# Patient Record
Sex: FEMALE | Race: OTHER | Employment: OTHER | URBAN - METROPOLITAN AREA
[De-identification: names, ages, dates, MRNs, and addresses within clinical notes are randomized per-mention and may not be internally consistent; named-entity substitution may affect disease eponyms.]

---

## 2017-01-16 ENCOUNTER — TELEPHONE (OUTPATIENT)
Dept: FAMILY MEDICINE CLINIC | Facility: CLINIC | Age: 72
End: 2017-01-16

## 2017-01-19 ENCOUNTER — OFFICE VISIT (OUTPATIENT)
Dept: FAMILY MEDICINE CLINIC | Facility: CLINIC | Age: 72
End: 2017-01-19

## 2017-01-19 VITALS — WEIGHT: 142 LBS | DIASTOLIC BLOOD PRESSURE: 70 MMHG | BODY MASS INDEX: 31 KG/M2 | SYSTOLIC BLOOD PRESSURE: 110 MMHG

## 2017-01-19 DIAGNOSIS — J18.0 BRONCHOPNEUMONIA: Primary | ICD-10-CM

## 2017-01-19 PROCEDURE — 99212 OFFICE O/P EST SF 10 MIN: CPT | Performed by: FAMILY MEDICINE

## 2017-01-19 RX ORDER — AMOXICILLIN AND CLAVULANATE POTASSIUM 875; 125 MG/1; MG/1
1 TABLET, FILM COATED ORAL 2 TIMES DAILY
Qty: 20 TABLET | Refills: 0 | Status: SHIPPED | OUTPATIENT
Start: 2017-01-19 | End: 2017-01-29

## 2017-01-19 NOTE — PROGRESS NOTES
[address] [facility] Office Note  Chief Complaint:   Patient presents with:  Cough: phlem      HPI:   This is a 73year old female coming in for    No results found for this or any previous visit.     No past medical history on fi Clear to auscultation bilterally, no rales/rhonchi/wheezing. CHEST: No tenderness. ABDOMEN:  Soft, nondistended, nontender, bowel sounds normal in all 4 quadrants, no masses, no hepatosplenomegaly. BACK: No tenderness, no spasm, SLR test negative, FROM.

## 2017-02-03 ENCOUNTER — TELEPHONE (OUTPATIENT)
Dept: FAMILY MEDICINE CLINIC | Facility: CLINIC | Age: 72
End: 2017-02-03

## 2017-02-03 DIAGNOSIS — S62.91XA RIGHT HAND FRACTURE, CLOSED, INITIAL ENCOUNTER: Primary | ICD-10-CM

## 2017-02-03 NOTE — TELEPHONE ENCOUNTER
Name and # given to daughter for specialist, if she has any issues call back so we can see what we can do.

## 2017-02-04 RX ORDER — MECLIZINE HYDROCHLORIDE 25 MG/1
25 TABLET ORAL 3 TIMES DAILY PRN
Qty: 15 TABLET | Refills: 0 | Status: SHIPPED | OUTPATIENT
Start: 2017-02-04 | End: 2017-02-10

## 2017-02-04 RX ORDER — ONDANSETRON 4 MG/1
4 TABLET, FILM COATED ORAL EVERY 4 HOURS PRN
Qty: 20 TABLET | Refills: 0 | Status: SHIPPED | OUTPATIENT
Start: 2017-02-04 | End: 2019-01-10

## 2017-02-06 PROBLEM — S52.90XA CLOSED FRACTURE OF RADIUS: Status: ACTIVE | Noted: 2017-02-06

## 2017-02-06 PROBLEM — M25.531 RIGHT WRIST PAIN: Status: ACTIVE | Noted: 2017-02-06

## 2017-02-08 ENCOUNTER — MED REC SCAN ONLY (OUTPATIENT)
Dept: FAMILY MEDICINE CLINIC | Facility: CLINIC | Age: 72
End: 2017-02-08

## 2017-02-09 ENCOUNTER — OFFICE VISIT (OUTPATIENT)
Dept: FAMILY MEDICINE CLINIC | Facility: CLINIC | Age: 72
End: 2017-02-09

## 2017-02-09 VITALS
HEIGHT: 57 IN | SYSTOLIC BLOOD PRESSURE: 110 MMHG | BODY MASS INDEX: 30.42 KG/M2 | WEIGHT: 141 LBS | DIASTOLIC BLOOD PRESSURE: 70 MMHG

## 2017-02-09 DIAGNOSIS — M81.0 OSTEOPOROSIS: ICD-10-CM

## 2017-02-09 DIAGNOSIS — S62.91XA RIGHT HAND FRACTURE, CLOSED, INITIAL ENCOUNTER: Primary | ICD-10-CM

## 2017-02-09 PROCEDURE — 99214 OFFICE O/P EST MOD 30 MIN: CPT | Performed by: FAMILY MEDICINE

## 2017-02-10 RX ORDER — ACETAMINOPHEN 325 MG/1
650 TABLET ORAL EVERY 6 HOURS PRN
Status: ON HOLD | COMMUNITY
End: 2017-02-14

## 2017-02-10 NOTE — PROGRESS NOTES
[address] [place] [address] Family Medicine Office Pre-OP Note    HPI:   Gallo Landers is a 73year old female with a hx of right hand  fracture, who presents for a pre-operative physical exam. Patient is to have ORIF, to by done by Dr. Varsha Christopher at [address] [facility] on February 14, 2017.      Tamera Collins pain, visual loss, blurred vision, double vision or yellow sclerae. Ears, Nose, Throat:  Denies hearing loss, sneezing, congestion, runny nose or sore throat. INTEGUMENTARY:  Denies rashes, itching, skin lesion, or excessive skin dryness.   CARDIOVASCULAR: No rashes, no skin lesion, no bruising, good turgor. HEART:  Regular rate and rhythm, no murmurs, rubs or gallops. LUNGS: Clear to auscultation bilterally, no rales/rhonchi/wheezing. CHEST: No tenderness.   ABDOMEN:  Soft, nondistended, nontender, bowel

## 2017-02-12 PROBLEM — S52.509A RADIUS AND ULNA DISTAL FRACTURE: Status: ACTIVE | Noted: 2017-02-12

## 2017-02-12 PROBLEM — S52.609A RADIUS AND ULNA DISTAL FRACTURE: Status: ACTIVE | Noted: 2017-02-12

## 2017-02-14 ENCOUNTER — APPOINTMENT (OUTPATIENT)
Dept: GENERAL RADIOLOGY | Facility: HOSPITAL | Age: 72
End: 2017-02-14
Attending: ORTHOPAEDIC SURGERY
Payer: MEDICARE

## 2017-02-14 ENCOUNTER — HOSPITAL ENCOUNTER (OUTPATIENT)
Facility: HOSPITAL | Age: 72
Setting detail: HOSPITAL OUTPATIENT SURGERY
Discharge: HOME | End: 2017-02-14
Attending: ORTHOPAEDIC SURGERY | Admitting: ORTHOPAEDIC SURGERY
Payer: MEDICARE

## 2017-02-14 ENCOUNTER — ANESTHESIA (OUTPATIENT)
Dept: SURGERY | Facility: HOSPITAL | Age: 72
End: 2017-02-14
Payer: MEDICARE

## 2017-02-14 ENCOUNTER — ANESTHESIA EVENT (OUTPATIENT)
Dept: SURGERY | Facility: HOSPITAL | Age: 72
End: 2017-02-14
Payer: MEDICARE

## 2017-02-14 ENCOUNTER — SURGERY (OUTPATIENT)
Age: 72
End: 2017-02-14

## 2017-02-14 VITALS
TEMPERATURE: 97 F | SYSTOLIC BLOOD PRESSURE: 153 MMHG | BODY MASS INDEX: 29.18 KG/M2 | HEART RATE: 88 BPM | OXYGEN SATURATION: 99 % | HEIGHT: 58 IN | DIASTOLIC BLOOD PRESSURE: 91 MMHG | WEIGHT: 139 LBS | RESPIRATION RATE: 15 BRPM

## 2017-02-14 DIAGNOSIS — S52.501A RADIUS AND ULNA DISTAL FRACTURE, RIGHT, CLOSED, INITIAL ENCOUNTER: Primary | ICD-10-CM

## 2017-02-14 DIAGNOSIS — S52.601A RADIUS AND ULNA DISTAL FRACTURE, RIGHT, CLOSED, INITIAL ENCOUNTER: Primary | ICD-10-CM

## 2017-02-14 PROCEDURE — 64450 NJX AA&/STRD OTHER PN/BRANCH: CPT | Performed by: ANESTHESIOLOGY

## 2017-02-14 PROCEDURE — 64415 NJX AA&/STRD BRCH PLXS IMG: CPT | Performed by: ORTHOPAEDIC SURGERY

## 2017-02-14 PROCEDURE — 76942 ECHO GUIDE FOR BIOPSY: CPT | Performed by: ORTHOPAEDIC SURGERY

## 2017-02-14 PROCEDURE — 0PSH04Z REPOSITION RIGHT RADIUS WITH INTERNAL FIXATION DEVICE, OPEN APPROACH: ICD-10-PCS | Performed by: ORTHOPAEDIC SURGERY

## 2017-02-14 PROCEDURE — 99152 MOD SED SAME PHYS/QHP 5/>YRS: CPT | Performed by: ORTHOPAEDIC SURGERY

## 2017-02-14 PROCEDURE — 3E0T3BZ INTRODUCTION OF ANESTHETIC AGENT INTO PERIPHERAL NERVES AND PLEXI, PERCUTANEOUS APPROACH: ICD-10-PCS | Performed by: ANESTHESIOLOGY

## 2017-02-14 PROCEDURE — 76000 FLUOROSCOPY <1 HR PHYS/QHP: CPT

## 2017-02-14 DEVICE — SCREW BN 2.4MM 16MM LCP SS: Type: IMPLANTABLE DEVICE | Site: RADIUS | Status: FUNCTIONAL

## 2017-02-14 DEVICE — SCREW BN 2.4MM 20MM LCP SS: Type: IMPLANTABLE DEVICE | Site: RADIUS | Status: FUNCTIONAL

## 2017-02-14 DEVICE — SCREW BN 2.4MM 12MM LCP SS: Type: IMPLANTABLE DEVICE | Site: RADIUS | Status: FUNCTIONAL

## 2017-02-14 DEVICE — IMPLANTABLE DEVICE: Type: IMPLANTABLE DEVICE | Site: RADIUS | Status: FUNCTIONAL

## 2017-02-14 RX ORDER — CEPHALEXIN 500 MG/1
500 CAPSULE ORAL 4 TIMES DAILY
Qty: 12 CAPSULE | Refills: 0 | Status: SHIPPED | OUTPATIENT
Start: 2017-02-14 | End: 2017-02-17

## 2017-02-14 RX ORDER — MORPHINE SULFATE 2 MG/ML
2 INJECTION, SOLUTION INTRAMUSCULAR; INTRAVENOUS EVERY 10 MIN PRN
Status: DISCONTINUED | OUTPATIENT
Start: 2017-02-14 | End: 2017-02-14

## 2017-02-14 RX ORDER — ROPIVACAINE HYDROCHLORIDE 5 MG/ML
INJECTION, SOLUTION EPIDURAL; INFILTRATION; PERINEURAL AS NEEDED
Status: DISCONTINUED | OUTPATIENT
Start: 2017-02-14 | End: 2017-02-14 | Stop reason: SURG

## 2017-02-14 RX ORDER — ONDANSETRON 2 MG/ML
INJECTION INTRAMUSCULAR; INTRAVENOUS AS NEEDED
Status: DISCONTINUED | OUTPATIENT
Start: 2017-02-14 | End: 2017-02-14 | Stop reason: SURG

## 2017-02-14 RX ORDER — FAMOTIDINE 20 MG/1
20 TABLET ORAL ONCE
Status: DISCONTINUED | OUTPATIENT
Start: 2017-02-14 | End: 2017-02-14 | Stop reason: HOSPADM

## 2017-02-14 RX ORDER — ACETAMINOPHEN 325 MG/1
650 TABLET ORAL ONCE
Status: DISCONTINUED | OUTPATIENT
Start: 2017-02-14 | End: 2017-02-14 | Stop reason: HOSPADM

## 2017-02-14 RX ORDER — METOCLOPRAMIDE HYDROCHLORIDE 5 MG/ML
INJECTION INTRAMUSCULAR; INTRAVENOUS AS NEEDED
Status: DISCONTINUED | OUTPATIENT
Start: 2017-02-14 | End: 2017-02-14 | Stop reason: SURG

## 2017-02-14 RX ORDER — HYDROCODONE BITARTRATE AND ACETAMINOPHEN 5; 325 MG/1; MG/1
1 TABLET ORAL AS NEEDED
Status: DISCONTINUED | OUTPATIENT
Start: 2017-02-14 | End: 2017-02-14

## 2017-02-14 RX ORDER — DEXAMETHASONE SODIUM PHOSPHATE 10 MG/ML
INJECTION, SOLUTION INTRAMUSCULAR; INTRAVENOUS AS NEEDED
Status: DISCONTINUED | OUTPATIENT
Start: 2017-02-14 | End: 2017-02-14 | Stop reason: SURG

## 2017-02-14 RX ORDER — MORPHINE SULFATE 4 MG/ML
4 INJECTION, SOLUTION INTRAMUSCULAR; INTRAVENOUS EVERY 10 MIN PRN
Status: DISCONTINUED | OUTPATIENT
Start: 2017-02-14 | End: 2017-02-14

## 2017-02-14 RX ORDER — LIDOCAINE HYDROCHLORIDE 10 MG/ML
INJECTION, SOLUTION EPIDURAL; INFILTRATION; INTRACAUDAL; PERINEURAL AS NEEDED
Status: DISCONTINUED | OUTPATIENT
Start: 2017-02-14 | End: 2017-02-14 | Stop reason: SURG

## 2017-02-14 RX ORDER — NALOXONE HYDROCHLORIDE 0.4 MG/ML
80 INJECTION, SOLUTION INTRAMUSCULAR; INTRAVENOUS; SUBCUTANEOUS AS NEEDED
Status: DISCONTINUED | OUTPATIENT
Start: 2017-02-14 | End: 2017-02-14

## 2017-02-14 RX ORDER — ONDANSETRON 2 MG/ML
4 INJECTION INTRAMUSCULAR; INTRAVENOUS ONCE AS NEEDED
Status: DISCONTINUED | OUTPATIENT
Start: 2017-02-14 | End: 2017-02-14

## 2017-02-14 RX ORDER — HYDROMORPHONE HYDROCHLORIDE 1 MG/ML
0.6 INJECTION, SOLUTION INTRAMUSCULAR; INTRAVENOUS; SUBCUTANEOUS EVERY 5 MIN PRN
Status: DISCONTINUED | OUTPATIENT
Start: 2017-02-14 | End: 2017-02-14

## 2017-02-14 RX ORDER — HYDROCODONE BITARTRATE AND ACETAMINOPHEN 5; 325 MG/1; MG/1
1 TABLET ORAL EVERY 4 HOURS PRN
Qty: 40 TABLET | Refills: 1 | Status: SHIPPED | OUTPATIENT
Start: 2017-02-14 | End: 2017-02-24

## 2017-02-14 RX ORDER — HYDROMORPHONE HYDROCHLORIDE 1 MG/ML
0.4 INJECTION, SOLUTION INTRAMUSCULAR; INTRAVENOUS; SUBCUTANEOUS EVERY 5 MIN PRN
Status: DISCONTINUED | OUTPATIENT
Start: 2017-02-14 | End: 2017-02-14

## 2017-02-14 RX ORDER — SODIUM CHLORIDE, SODIUM LACTATE, POTASSIUM CHLORIDE, CALCIUM CHLORIDE 600; 310; 30; 20 MG/100ML; MG/100ML; MG/100ML; MG/100ML
INJECTION, SOLUTION INTRAVENOUS CONTINUOUS
Status: DISCONTINUED | OUTPATIENT
Start: 2017-02-14 | End: 2017-02-14

## 2017-02-14 RX ORDER — HYDROMORPHONE HYDROCHLORIDE 1 MG/ML
0.2 INJECTION, SOLUTION INTRAMUSCULAR; INTRAVENOUS; SUBCUTANEOUS EVERY 5 MIN PRN
Status: DISCONTINUED | OUTPATIENT
Start: 2017-02-14 | End: 2017-02-14

## 2017-02-14 RX ORDER — HYDROCODONE BITARTRATE AND ACETAMINOPHEN 5; 325 MG/1; MG/1
2 TABLET ORAL AS NEEDED
Status: DISCONTINUED | OUTPATIENT
Start: 2017-02-14 | End: 2017-02-14

## 2017-02-14 RX ORDER — MORPHINE SULFATE 10 MG/ML
6 INJECTION, SOLUTION INTRAMUSCULAR; INTRAVENOUS EVERY 10 MIN PRN
Status: DISCONTINUED | OUTPATIENT
Start: 2017-02-14 | End: 2017-02-14

## 2017-02-14 RX ORDER — MIDAZOLAM HYDROCHLORIDE 1 MG/ML
INJECTION INTRAMUSCULAR; INTRAVENOUS AS NEEDED
Status: DISCONTINUED | OUTPATIENT
Start: 2017-02-14 | End: 2017-02-14 | Stop reason: SURG

## 2017-02-14 RX ORDER — DEXAMETHASONE SODIUM PHOSPHATE 4 MG/ML
VIAL (ML) INJECTION AS NEEDED
Status: DISCONTINUED | OUTPATIENT
Start: 2017-02-14 | End: 2017-02-14 | Stop reason: SURG

## 2017-02-14 RX ORDER — SODIUM CHLORIDE, SODIUM LACTATE, POTASSIUM CHLORIDE, CALCIUM CHLORIDE 600; 310; 30; 20 MG/100ML; MG/100ML; MG/100ML; MG/100ML
INJECTION, SOLUTION INTRAVENOUS CONTINUOUS PRN
Status: DISCONTINUED | OUTPATIENT
Start: 2017-02-14 | End: 2017-02-14 | Stop reason: SURG

## 2017-02-14 RX ORDER — METOCLOPRAMIDE 10 MG/1
10 TABLET ORAL ONCE
Status: DISCONTINUED | OUTPATIENT
Start: 2017-02-14 | End: 2017-02-14 | Stop reason: HOSPADM

## 2017-02-14 RX ADMIN — MIDAZOLAM HYDROCHLORIDE 1 MG: 1 INJECTION INTRAMUSCULAR; INTRAVENOUS at 13:37:00

## 2017-02-14 RX ADMIN — ONDANSETRON 4 MG: 2 INJECTION INTRAMUSCULAR; INTRAVENOUS at 15:11:00

## 2017-02-14 RX ADMIN — SODIUM CHLORIDE, SODIUM LACTATE, POTASSIUM CHLORIDE, CALCIUM CHLORIDE: 600; 310; 30; 20 INJECTION, SOLUTION INTRAVENOUS at 14:13:00

## 2017-02-14 RX ADMIN — ROPIVACAINE HYDROCHLORIDE 30 ML: 5 INJECTION, SOLUTION EPIDURAL; INFILTRATION; PERINEURAL at 13:48:00

## 2017-02-14 RX ADMIN — LIDOCAINE HYDROCHLORIDE 25 MG: 10 INJECTION, SOLUTION EPIDURAL; INFILTRATION; INTRACAUDAL; PERINEURAL at 13:41:00

## 2017-02-14 RX ADMIN — LIDOCAINE HYDROCHLORIDE 50 MG: 10 INJECTION, SOLUTION EPIDURAL; INFILTRATION; INTRACAUDAL; PERINEURAL at 14:17:00

## 2017-02-14 RX ADMIN — DEXAMETHASONE SODIUM PHOSPHATE 8 MG: 4 MG/ML VIAL (ML) INJECTION at 14:20:00

## 2017-02-14 RX ADMIN — SODIUM CHLORIDE, SODIUM LACTATE, POTASSIUM CHLORIDE, CALCIUM CHLORIDE: 600; 310; 30; 20 INJECTION, SOLUTION INTRAVENOUS at 15:27:00

## 2017-02-14 RX ADMIN — DEXAMETHASONE SODIUM PHOSPHATE 10 MG: 10 INJECTION, SOLUTION INTRAMUSCULAR; INTRAVENOUS at 13:48:00

## 2017-02-14 RX ADMIN — ROPIVACAINE HYDROCHLORIDE 10 ML: 5 INJECTION, SOLUTION EPIDURAL; INFILTRATION; PERINEURAL at 13:51:00

## 2017-02-14 RX ADMIN — METOCLOPRAMIDE HYDROCHLORIDE 10 MG: 5 INJECTION INTRAMUSCULAR; INTRAVENOUS at 14:20:00

## 2017-02-14 NOTE — BRIEF OP NOTE
[address]  Brief Op Note      [facility] Location: OR   [facility] [id number] MRN [id number]   Admission Date 2/14/2017 Operation Date 2/14/2017   Attending Physician Zahraa Powers MD Operating Physician James Alanis MD       Pre-Opera

## 2017-02-14 NOTE — INTERVAL H&P NOTE
Pre-op Diagnosis: right distal radius fracture    The above referenced H&P was reviewed by Nico Gonzales MD on 2/14/2017, the patient was examined and no significant changes have occurred in the patient's condition since the H&P was performed.   I discuss

## 2017-02-14 NOTE — ANESTHESIA POSTPROCEDURE EVALUATION
Patient: Daria Favor    Procedure Summary     Date Anesthesia Start Anesthesia Stop Room / Location    02/14/17 1413 1522 [address] [facility] MAIN OR 05 / [facility] MAIN OR       Procedure Diagnosis Surgeon Responsible Provider    HAND OPEN REDUCTION INTERNAL FIXATION (Right )

## 2017-02-14 NOTE — ANESTHESIA PROCEDURE NOTES
Peripheral Block  Performed by: CATHY BARNES  Authorized by: CATHY BARNES    Patient Location:  Pre-op  Start Time:  2/14/2017 1:37 PM  End Time:  2/14/2017 1:52 PM  Site Identification: ultrasound guided, real time ultrasound guided, nerve stimulato

## 2017-02-14 NOTE — ANESTHESIA PREPROCEDURE EVALUATION
Anesthesia PreOp Note    HPI:     Daria Driver is a 73year old female who presents for preoperative consultation requested by:  Nany Durbin MD    Date of Surgery: 2/14/2017    Procedure(s):  HAND OPEN REDUCTION INTERNAL FIXATION  Indication: right dis Rfl: 3 Past Week at Unknown time       Current Facility-Administered Medications Ordered in Epic:  lactated ringers infusion  Intravenous Continuous Rachael Paez MD   acetaminophen (TYLENOL) tab 650 mg 650 mg Oral Once Beatriz Aguirre MD   famoTIDine ROS  Exercise tolerance: good    Rhythm: regular    Neuro/Psych - negative ROS     GI/Hepatic/Renal - negative ROS   (+) GERD well controlled,     Endo/Other - negative ROS   Abdominal         npo p mn  Daughter interprets     Anesthesia Plan:   ASA:  2  P

## 2017-02-14 NOTE — DISCHARGE SUMMARY
Outpatient Surgery Brief Discharge Summary         Patient ID:  Lila Valero  [id number]  73year old  10/10/1945    Discharge Diagnoses: right distal radius fracture    Procedures: [unfilled]    Discharged Condition: stable    Disposition: home    Patient

## 2017-02-16 NOTE — OPERATIVE REPORT
[facility]    PATIENT'S NAME: Dale Indy PHYSICIAN: Justo Rojas MD   OPERATING PHYSICIAN: Justo Rojas MD   PATIENT ACCOUNT#:   [id number]    LOCATION:  [place] [address]  MEDICAL RECORD #:   [id number]       DATE OF BIRTH into the radiocarpal joint and the distal radioulnar joint in multiple areas. The fracture was unstable and displaced.   There was fibrous tissue and some interposed periosteal tissue, as well as some muscle remnants which were interposed at the fracture s hardware is in appropriate position.       Dictated By Felipa Vega MD  d: 02/16/2017 13:06:22  t: 02/16/2017 14:38:34  Job [id number]  XO/

## 2017-04-10 ENCOUNTER — MED REC SCAN ONLY (OUTPATIENT)
Dept: FAMILY MEDICINE CLINIC | Facility: CLINIC | Age: 72
End: 2017-04-10

## 2017-05-10 ENCOUNTER — MED REC SCAN ONLY (OUTPATIENT)
Dept: FAMILY MEDICINE CLINIC | Facility: CLINIC | Age: 72
End: 2017-05-10

## 2017-06-27 ENCOUNTER — NURSE ONLY (OUTPATIENT)
Dept: FAMILY MEDICINE CLINIC | Facility: CLINIC | Age: 72
End: 2017-06-27

## 2017-06-27 DIAGNOSIS — Z79.899 ENCOUNTER FOR LONG-TERM CURRENT USE OF MEDICATION: ICD-10-CM

## 2017-06-27 DIAGNOSIS — Z00.00 LABORATORY EXAMINATION ORDERED AS PART OF A ROUTINE GENERAL MEDICAL EXAMINATION: Primary | ICD-10-CM

## 2017-06-27 DIAGNOSIS — M80.00XD AGE-RELATED OSTEOPOROSIS WITH CURRENT PATHOLOGICAL FRACTURE WITH ROUTINE HEALING, SUBSEQUENT ENCOUNTER: ICD-10-CM

## 2017-06-27 DIAGNOSIS — E78.00 HYPERCHOLESTEREMIA: ICD-10-CM

## 2017-06-27 PROCEDURE — 36415 COLL VENOUS BLD VENIPUNCTURE: CPT | Performed by: FAMILY MEDICINE

## 2017-06-27 PROCEDURE — 80053 COMPREHEN METABOLIC PANEL: CPT | Performed by: FAMILY MEDICINE

## 2017-06-27 PROCEDURE — 85025 COMPLETE CBC W/AUTO DIFF WBC: CPT | Performed by: FAMILY MEDICINE

## 2017-06-27 PROCEDURE — 80061 LIPID PANEL: CPT | Performed by: FAMILY MEDICINE

## 2017-06-27 NOTE — PROGRESS NOTES
Patient presented to clinic for routine blood work. Orders verified in patient chart. Patient due for routines back in May. Name and  verified. Patient is fasting. Blood drawn from the right dorsal hand, tolerated well without complications.

## 2017-07-03 ENCOUNTER — TELEPHONE (OUTPATIENT)
Dept: FAMILY MEDICINE CLINIC | Facility: CLINIC | Age: 72
End: 2017-07-03

## 2017-11-27 ENCOUNTER — OFFICE VISIT (OUTPATIENT)
Dept: FAMILY MEDICINE CLINIC | Facility: CLINIC | Age: 72
End: 2017-11-27

## 2017-11-27 VITALS
DIASTOLIC BLOOD PRESSURE: 80 MMHG | BODY MASS INDEX: 30.85 KG/M2 | SYSTOLIC BLOOD PRESSURE: 118 MMHG | WEIGHT: 143 LBS | OXYGEN SATURATION: 100 % | HEIGHT: 57 IN | HEART RATE: 75 BPM

## 2017-11-27 DIAGNOSIS — Z00.00 ENCOUNTER FOR MEDICARE ANNUAL WELLNESS EXAM: ICD-10-CM

## 2017-11-27 DIAGNOSIS — M81.0 OSTEOPOROSIS WITHOUT CURRENT PATHOLOGICAL FRACTURE, UNSPECIFIED OSTEOPOROSIS TYPE: Primary | ICD-10-CM

## 2017-11-27 DIAGNOSIS — R10.13 DYSPEPSIA: ICD-10-CM

## 2017-11-27 DIAGNOSIS — N39.3 STRESS INCONTINENCE OF URINE: ICD-10-CM

## 2017-11-27 PROCEDURE — G0439 PPPS, SUBSEQ VISIT: HCPCS | Performed by: FAMILY MEDICINE

## 2017-11-27 RX ORDER — ALENDRONATE SODIUM 70 MG/1
70 TABLET ORAL WEEKLY
Qty: 12 TABLET | Refills: 3 | Status: SHIPPED | OUTPATIENT
Start: 2017-11-27 | End: 2018-12-17

## 2017-11-28 ENCOUNTER — TELEPHONE (OUTPATIENT)
Dept: FAMILY MEDICINE CLINIC | Facility: CLINIC | Age: 72
End: 2017-11-28

## 2017-11-28 NOTE — TELEPHONE ENCOUNTER
Pt's daughter called, stated her mother saw Dr. Oliverio Hull yesterday and she mentioned a pain on her shoulder from a possible cyst. The daughter explained that it was noticed about 3 weeks ago, but it is painful and she is worried it might be malignant.  It was

## 2017-11-29 NOTE — PROGRESS NOTES
HPI:   Andrew Oakley is a 81year old female who presents for a Medicare Subsequent Annual Wellness visit (Pt already had Initial Annual Wellness).     Annual Physical due on 11/27/2018        Patient Care Team: Patient Care Team:  Radha Sam MD as PCP - unusual skin lesions  EYES: denies blurred vision or double vision  HEENT: denies nasal congestion, sinus pain or ST  LUNGS: denies shortness of breath with exertion  CARDIOVASCULAR: denies chest pain on exertion  GI: denies abdominal pain, denies heartbur murmur, rub, or gallop   Abdomen:   Soft, non-tender, bowel sounds active all four quadrants,  no masses, no organomegaly   Pelvic: Deferred   Extremities: Extremities normal, atraumatic, no cyanosis or edema   Pulses: 2+ and symmetric   Skin: Skin color, current health state?: Good    How do you maintain positive mental well-being?: Social Interaction; Visiting Friends; Visiting Family    If you are a male age 38-65 or a female age 47-67, do you take aspirin?: Yes    Have you had any immunizations at another Lab or Procedure External Lab or Procedure   Diabetes Screening      HbgA1C   Annually No results found for: A1C No flowsheet data found.     Fasting Blood Sugar (FSB)Annually   Glucose (mg/dL)   Date Value   06/27/2017 85   ----------       Cardiovascular found Medium/high risk factors:   End-stage renal disease   Hemophiliacs who received Factor VIII or IX concentrates   Clients of institutions for the mentally retarded   Persons who live in the same house as a HepB virus carrier   Homosexual men   Illicit

## 2017-11-29 NOTE — PATIENT INSTRUCTIONS
Maribel Blanco's SCREENING SCHEDULE   Tests on this list are recommended by your physician but may not be covered, or covered at this frequency, by your insurer. Please check with your insurance carrier before scheduling to verify coverage.    PREVENTATIVE more often if abnormal Colonoscopy,10 Years due on 08/16/2026 Update Health Maintenance if applicable    Flex Sigmoidoscopy Screen  Covered every 5 years No results found for this or any previous visit. No flowsheet data found.      Fecal Occult Blood   Cov Pneumococcal 23 (Pneumovax)  Covered Once after 65 No orders found for this or any previous visit. Please get once after your 65th birthday    Hepatitis B for Moderate/High Risk       No orders found for this or any previous visit.  Medium/high risk factors

## 2018-12-17 ENCOUNTER — TELEPHONE (OUTPATIENT)
Dept: FAMILY MEDICINE CLINIC | Facility: CLINIC | Age: 73
End: 2018-12-17

## 2018-12-17 RX ORDER — OMEPRAZOLE 20 MG/1
20 CAPSULE, DELAYED RELEASE ORAL
Qty: 30 CAPSULE | Refills: 2 | Status: SHIPPED | OUTPATIENT
Start: 2018-12-17 | End: 2019-01-10

## 2018-12-17 RX ORDER — ALENDRONATE SODIUM 70 MG/1
70 TABLET ORAL WEEKLY
Qty: 12 TABLET | Refills: 3 | OUTPATIENT
Start: 2018-12-17

## 2018-12-17 RX ORDER — ALENDRONATE SODIUM 70 MG/1
70 TABLET ORAL WEEKLY
Qty: 12 TABLET | Refills: 3 | Status: SHIPPED | OUTPATIENT
Start: 2018-12-17 | End: 2019-01-10

## 2020-03-03 RX ORDER — OMEPRAZOLE 20 MG/1
CAPSULE, DELAYED RELEASE ORAL
Qty: 90 CAPSULE | Refills: 3 | OUTPATIENT
Start: 2020-03-03

## 2020-03-03 RX ORDER — ALENDRONATE SODIUM 70 MG/1
70 TABLET ORAL WEEKLY
Qty: 12 TABLET | Refills: 3 | OUTPATIENT
Start: 2020-03-03

## 2020-12-14 ENCOUNTER — TELEPHONE (OUTPATIENT)
Dept: GASTROENTEROLOGY | Facility: CLINIC | Age: 75
End: 2020-12-14

## 2020-12-14 ENCOUNTER — OFFICE VISIT (OUTPATIENT)
Dept: GASTROENTEROLOGY | Facility: CLINIC | Age: 75
End: 2020-12-14
Payer: MEDICARE

## 2020-12-14 VITALS
DIASTOLIC BLOOD PRESSURE: 76 MMHG | BODY MASS INDEX: 30.42 KG/M2 | HEIGHT: 57 IN | HEART RATE: 87 BPM | SYSTOLIC BLOOD PRESSURE: 146 MMHG | WEIGHT: 141 LBS

## 2020-12-14 DIAGNOSIS — R79.89 ELEVATED LFTS: Primary | ICD-10-CM

## 2020-12-14 PROCEDURE — 99204 OFFICE O/P NEW MOD 45 MIN: CPT | Performed by: INTERNAL MEDICINE

## 2020-12-14 PROCEDURE — G0463 HOSPITAL OUTPT CLINIC VISIT: HCPCS | Performed by: INTERNAL MEDICINE

## 2020-12-14 RX ORDER — SODIUM, POTASSIUM,MAG SULFATES 17.5-3.13G
SOLUTION, RECONSTITUTED, ORAL ORAL
Qty: 1 BOTTLE | Refills: 0 | Status: ON HOLD | OUTPATIENT
Start: 2020-12-14 | End: 2021-01-20

## 2020-12-14 NOTE — PATIENT INSTRUCTIONS
# history of polyps  # reflux  # elevated LFTs, acute, infection negative.  LFTs normal except 9/2019 when she was taking tylenol      Recommend:  RUQ US  Repeat labs in 8-12 weeks-- LFTs  -Schedule EGD/colonoscopy w/ MAC sedation for history of polyps  -Pr

## 2020-12-14 NOTE — TELEPHONE ENCOUNTER
Scheduled for:  Colonoscopy 98047 EGD [id number]  Provider Name:  Dr Natali Galaviz  Date:  01/20/2021  Location:  [facility]  Sedation:  MAC  Time:  0800 (pt is aware to arrive at 0700)    Prep:  MAC  Meds/Allergies Reconciled?:  Physician reviewed  Diagnosis with codes:  El

## 2020-12-14 NOTE — H&P
[facility] - Gastroenterology                                                                                                  Clinic History and Physical Alcohol use: No    Drug use: No       Medications (Active prior to today's visit):  Current Outpatient Medications   Medication Sig Dispense Refill   • OMEPRAZOLE 20 MG Oral Capsule Delayed Release TAKE 1 CAPSULE BY MOUTH EVERY DAY 90 capsule 1   • RAMAKRISHNA gross movement of all 4 extremities     Labs/Imaging:     Patient's labs and imaging were reviewed and discussed with patient today. .  ASSESSMENT/PLAN:       # history of polyps  # reflux  # elevated LFTs, acute, infection negative.  LFTs normal except 9

## 2020-12-29 ENCOUNTER — HOSPITAL ENCOUNTER (OUTPATIENT)
Dept: ULTRASOUND IMAGING | Age: 75
Discharge: HOME | End: 2020-12-29
Attending: INTERNAL MEDICINE
Payer: MEDICARE

## 2020-12-29 DIAGNOSIS — R79.89 ELEVATED LFTS: ICD-10-CM

## 2020-12-29 PROCEDURE — 76705 ECHO EXAM OF ABDOMEN: CPT | Performed by: INTERNAL MEDICINE

## 2021-01-07 ENCOUNTER — TELEPHONE (OUTPATIENT)
Dept: GASTROENTEROLOGY | Facility: CLINIC | Age: 76
End: 2021-01-07

## 2021-01-07 NOTE — TELEPHONE ENCOUNTER
Tawana Pelletier MD  P [facility] Clinical Staff             Recall RUQ US in 1 year to follow up gallbladder polyp   Repeat LFTs ordered

## 2021-01-13 NOTE — PAT NURSING NOTE
I reviewed time and location of the procedure at [facility] with the patient. As well was all NE criteria. I also went over prep times and diet prior to procedure.

## 2021-01-14 ENCOUNTER — TELEPHONE (OUTPATIENT)
Dept: GASTROENTEROLOGY | Facility: CLINIC | Age: 76
End: 2021-01-14

## 2021-01-14 NOTE — TELEPHONE ENCOUNTER
Pts daughter Ardyce Acron called with questions about ultrasound results and also upcoming colonoscopy. Please call.

## 2021-01-14 NOTE — TELEPHONE ENCOUNTER
[facility] Verner [facility] [address] on [facility]-    Pt daughter called in regarding pt's most recent US Abdomen Results completed on 12/29/2020.  Please advise on the following results:     FINDINGS:          LIVER:               The liver measures approxima

## 2021-01-14 NOTE — TELEPHONE ENCOUNTER
OFFICE/CLINIC ON-CALL:    Please let patient know that she should keep appt with Dr. Sona Hdez for EGd/CLN. I reviewed the ab US--->nothing urgent, no gallstones. No obvious acute cholecystitis.  She has a small Gb polyp which can be monitored on repeat ab US i

## 2021-01-14 NOTE — TELEPHONE ENCOUNTER
RN spoke to pt to rely results below. Verified . Voiced understanding and recall placed for 1 year US Abdomen Recall. Entered into Epic. Recall US Abdomen in 1 year per Dr. Fernando Breaux. Last US Abdomen done 2020.  Recall entered into Patient Outreach

## 2021-01-17 ENCOUNTER — LAB ENCOUNTER (OUTPATIENT)
Dept: LAB | Facility: HOSPITAL | Age: 76
End: 2021-01-17
Attending: INTERNAL MEDICINE
Payer: MEDICARE

## 2021-01-17 DIAGNOSIS — Z01.818 PREOP TESTING: ICD-10-CM

## 2021-01-17 LAB — SARS-COV-2 RNA RESP QL NAA+PROBE: NOT DETECTED

## 2021-01-20 ENCOUNTER — HOSPITAL ENCOUNTER (OUTPATIENT)
Age: 76
Setting detail: HOSPITAL OUTPATIENT SURGERY
Discharge: HOME | End: 2021-01-20
Attending: INTERNAL MEDICINE | Admitting: INTERNAL MEDICINE
Payer: MEDICARE

## 2021-01-20 ENCOUNTER — ANESTHESIA EVENT (OUTPATIENT)
Dept: ENDOSCOPY | Age: 76
End: 2021-01-20
Payer: MEDICARE

## 2021-01-20 ENCOUNTER — ANESTHESIA (OUTPATIENT)
Dept: ENDOSCOPY | Age: 76
End: 2021-01-20
Payer: MEDICARE

## 2021-01-20 VITALS
RESPIRATION RATE: 19 BRPM | HEART RATE: 60 BPM | HEIGHT: 57 IN | DIASTOLIC BLOOD PRESSURE: 78 MMHG | WEIGHT: 140 LBS | SYSTOLIC BLOOD PRESSURE: 142 MMHG | BODY MASS INDEX: 30.2 KG/M2 | OXYGEN SATURATION: 100 %

## 2021-01-20 DIAGNOSIS — Z01.818 PREOP TESTING: Primary | ICD-10-CM

## 2021-01-20 DIAGNOSIS — R79.89 ELEVATED LFTS: ICD-10-CM

## 2021-01-20 PROCEDURE — 99070 SPECIAL SUPPLIES PHYS/QHP: CPT | Performed by: INTERNAL MEDICINE

## 2021-01-20 PROCEDURE — 45385 COLONOSCOPY W/LESION REMOVAL: CPT | Performed by: INTERNAL MEDICINE

## 2021-01-20 PROCEDURE — 43239 EGD BIOPSY SINGLE/MULTIPLE: CPT | Performed by: INTERNAL MEDICINE

## 2021-01-20 RX ORDER — SODIUM CHLORIDE, SODIUM LACTATE, POTASSIUM CHLORIDE, CALCIUM CHLORIDE 600; 310; 30; 20 MG/100ML; MG/100ML; MG/100ML; MG/100ML
INJECTION, SOLUTION INTRAVENOUS CONTINUOUS
Status: DISCONTINUED | OUTPATIENT
Start: 2021-01-20 | End: 2021-01-20

## 2021-01-20 RX ORDER — SODIUM CHLORIDE, SODIUM LACTATE, POTASSIUM CHLORIDE, CALCIUM CHLORIDE 600; 310; 30; 20 MG/100ML; MG/100ML; MG/100ML; MG/100ML
INJECTION, SOLUTION INTRAVENOUS CONTINUOUS PRN
Status: DISCONTINUED | OUTPATIENT
Start: 2021-01-20 | End: 2021-01-20 | Stop reason: SURG

## 2021-01-20 RX ORDER — NALOXONE HYDROCHLORIDE 0.4 MG/ML
80 INJECTION, SOLUTION INTRAMUSCULAR; INTRAVENOUS; SUBCUTANEOUS AS NEEDED
Status: DISCONTINUED | OUTPATIENT
Start: 2021-01-20 | End: 2021-01-20

## 2021-01-20 RX ORDER — LIDOCAINE HYDROCHLORIDE 10 MG/ML
INJECTION, SOLUTION EPIDURAL; INFILTRATION; INTRACAUDAL; PERINEURAL AS NEEDED
Status: DISCONTINUED | OUTPATIENT
Start: 2021-01-20 | End: 2021-01-20 | Stop reason: SURG

## 2021-01-20 RX ADMIN — SODIUM CHLORIDE, SODIUM LACTATE, POTASSIUM CHLORIDE, CALCIUM CHLORIDE: 600; 310; 30; 20 INJECTION, SOLUTION INTRAVENOUS at 08:48:00

## 2021-01-20 RX ADMIN — SODIUM CHLORIDE, SODIUM LACTATE, POTASSIUM CHLORIDE, CALCIUM CHLORIDE: 600; 310; 30; 20 INJECTION, SOLUTION INTRAVENOUS at 08:09:00

## 2021-01-20 RX ADMIN — LIDOCAINE HYDROCHLORIDE 50 MG: 10 INJECTION, SOLUTION EPIDURAL; INFILTRATION; INTRACAUDAL; PERINEURAL at 08:09:00

## 2021-01-20 NOTE — ANESTHESIA POSTPROCEDURE EVALUATION
Patient: Cheryl Epps    Procedure Summary     Date: 01/20/21 Room / Location: [facility] ENDOSCOPY 01 / [facility] ENDO    Anesthesia Start: 2601 Anesthesia Stop: 0848    Procedures:       COLONOSCOPY (N/A )      ESOPHAGOGASTRODUODENOSCOPY (EGD) (N/A ) Diagnosis:

## 2021-01-20 NOTE — H&P
Pre Procedure History & Physical Examination    Patient Name: Romana Ramires  MRN: [id number]  [facility]: [id number]  YOB: 1945    Diagnosis: reflux and history of polyps      •  OMEPRAZOLE 20 MG Oral Capsule Delayed Release, TAKE 1 CAPSULE BY MOUTH jaundice   ALLERGIC/IMMUNOLOGIC:  negative for hay fever  ENDOCRINE:  negative for cold intolerance and heat intolerance  MUSCULOSKELETAL:  negative for joint effusion/severe erythema  BEHAVIOR/PSYCH:  negative for psychotic behavior      PHYSICAL EXAM:

## 2021-01-20 NOTE — ANESTHESIA PREPROCEDURE EVALUATION
Anesthesia PreOp Note    HPI:     Noe Reese is a 70year old female who presents for preoperative consultation requested by: Ab Dill MD    Date of Surgery: 1/20/2021    Procedure(s):  COLONOSCOPY  ESOPHAGOGASTRODUODENOSCOPY (EGD)  Indication: Elev MG Oral Tab, 1-2 tablets every 8 hrs prn pain (Patient not taking: Reported on 12/14/2020 ), Disp: 30 tablet, Rfl: 1      No current Epic-ordered facility-administered medications on file. No current Epic-ordered outpatient medications on file.       No K Results   Component Value Date    WBC 6.4 11/30/2020    RBC 4.74 11/30/2020    HGB 14.1 11/30/2020    HCT 41.6 11/30/2020    MCV 87.8 11/30/2020    MCH 29.7 11/30/2020    MCHC 33.9 11/30/2020    RDW 14.2 11/30/2020     11/30/2020     Lab Results   C

## 2021-01-20 NOTE — OPERATIVE REPORT
ESOPHAGOGASTRODUODENOSCOPY (EGD) & COLONOSCOPY REPORT    Kirsten Modi     10/10/1945 Age 70year old   PCP Sampson Ferrell MD Endoscopist Mary Pate MD     Date of procedure: 21    Procedure: EGD w/ biopsies & Colonoscopy w/cold snare polypectomy withdrew the instrument from the patient who tolerated the procedure well. Complications: None    EGD findings:      1. Esophagus: The squamocolumnar junction was noted at 30 cm and appeared regular.  The GE junction was noted at 30 cm from the incisors and sigmoid polyp

## 2021-01-26 ENCOUNTER — TELEPHONE (OUTPATIENT)
Dept: GASTROENTEROLOGY | Facility: CLINIC | Age: 76
End: 2021-01-26

## 2021-01-27 ENCOUNTER — TELEPHONE (OUTPATIENT)
Dept: GASTROENTEROLOGY | Facility: CLINIC | Age: 76
End: 2021-01-27

## 2021-01-27 NOTE — TELEPHONE ENCOUNTER
RN reached out to pt's daughter to relay notes below. Pt's [facility] verified pt's . Read Dr. Leonarda Kilgore result note below and advised that next CLN procedure, based off current health care guidelines, will be due 2026.  Daughter voiced unders

## 2021-01-27 NOTE — TELEPHONE ENCOUNTER
Entered into [facility]. Recall CLN in 3 years per Dr. Malcolm Arshad. Last CLN done 01/20/2021. Recall entered into Patient Outreach for 01/20/2024. HM updated.

## 2021-01-27 NOTE — TELEPHONE ENCOUNTER
----- Message from Radha Newell MD sent at 1/27/2021  1:05 PM CST -----  GI staff: please place recall for colonoscopy in 3 years   Replied to telephone encounter for nursing to relay results.

## 2021-01-27 NOTE — TELEPHONE ENCOUNTER
Please notify of below:         Dear Sravani Catalan,    The biopsies from the stomach were negative for H pylori infection. No obvious severe reflux changes. Continue antireflux measures.      I reviewed the pathology report from the polyp(s) completely removed dur

## 2021-03-09 DIAGNOSIS — Z23 NEED FOR VACCINATION: ICD-10-CM

## 2021-04-13 ENCOUNTER — TELEPHONE (OUTPATIENT)
Dept: GASTROENTEROLOGY | Facility: CLINIC | Age: 76
End: 2021-04-13

## 2021-06-21 ENCOUNTER — TELEPHONE (OUTPATIENT)
Dept: GASTROENTEROLOGY | Facility: CLINIC | Age: 76
End: 2021-06-21

## 2021-06-21 NOTE — TELEPHONE ENCOUNTER
Pt's daughter Jamison Newberry states Dr wanted pt to do follow up blood work a couple of months after the colonoscopy  but there are no orders in the system.  Please call Jamison Newberry at [phone]

## 2021-06-21 NOTE — TELEPHONE ENCOUNTER
Per review of chart, [facility] labs active in orders. Placed by Dr. Lamar Newberry 12/14/2020. Returned patients daughter call to inform on orders and notify may complete at any [place] lab location at her earliest convenience.      Patients daughter verbalized under

## 2021-08-30 ENCOUNTER — LAB ENCOUNTER (OUTPATIENT)
Dept: LAB | Age: 76
End: 2021-08-30
Attending: INTERNAL MEDICINE
Payer: MEDICARE

## 2021-08-30 LAB
ALBUMIN SERPL-MCNC: 3.3 G/DL (ref 3.4–5)
ALP LIVER SERPL-CCNC: 72 U/L
ALT SERPL-CCNC: 20 U/L
AST SERPL-CCNC: 25 U/L (ref 15–37)
BILIRUB DIRECT SERPL-MCNC: 0.2 MG/DL (ref 0–0.2)
BILIRUB SERPL-MCNC: 0.6 MG/DL (ref 0.1–2)
M PROTEIN MFR SERPL ELPH: 7.1 G/DL (ref 6.4–8.2)

## 2021-08-30 PROCEDURE — 36415 COLL VENOUS BLD VENIPUNCTURE: CPT | Performed by: INTERNAL MEDICINE

## 2021-08-30 PROCEDURE — 80076 HEPATIC FUNCTION PANEL: CPT | Performed by: INTERNAL MEDICINE

## 2021-09-03 ENCOUNTER — TELEPHONE (OUTPATIENT)
Dept: GASTROENTEROLOGY | Facility: CLINIC | Age: 76
End: 2021-09-03

## 2021-09-03 NOTE — TELEPHONE ENCOUNTER
Liver function tests have become normal  Will check in 6 months to a year  Follow up in clinic in that time frame

## 2021-09-03 NOTE — TELEPHONE ENCOUNTER
Dr. Shima Prater,     Patient completed hfp labs 08/30/2021 and would like to review results and next steps if indicated. Attached below for review. Thank you!      Component      Latest Ref Rng & Units 8/30/2021   AST (SGOT)      15 - 37 U/L 25   ALT (SGP

## 2021-09-03 NOTE — TELEPHONE ENCOUNTER
Reached out to [facility]. Verified . Reviewed lab results and recommendations per MD below. Understands and stated she will call back to schedule follow up in clinic, as recommended (6 month).      Assured our office will notify her when she is due

## 2022-04-15 ENCOUNTER — HOSPITAL ENCOUNTER (EMERGENCY)
Facility: HOSPITAL | Age: 77
Discharge: HOME | End: 2022-04-15
Payer: MEDICARE

## 2022-04-15 ENCOUNTER — APPOINTMENT (OUTPATIENT)
Dept: GENERAL RADIOLOGY | Facility: HOSPITAL | Age: 77
End: 2022-04-15
Payer: MEDICARE

## 2022-04-15 VITALS
HEART RATE: 99 BPM | DIASTOLIC BLOOD PRESSURE: 79 MMHG | WEIGHT: 140 LBS | BODY MASS INDEX: 30 KG/M2 | RESPIRATION RATE: 16 BRPM | SYSTOLIC BLOOD PRESSURE: 161 MMHG | OXYGEN SATURATION: 94 % | TEMPERATURE: 101 F

## 2022-04-15 DIAGNOSIS — J02.0 STREPTOCOCCAL SORE THROAT: ICD-10-CM

## 2022-04-15 DIAGNOSIS — J10.1 INFLUENZA A: Primary | ICD-10-CM

## 2022-04-15 LAB
ANION GAP SERPL CALC-SCNC: 6 MMOL/L (ref 0–18)
BASOPHILS # BLD AUTO: 0.02 X10(3) UL (ref 0–0.2)
BASOPHILS NFR BLD AUTO: 0.2 %
BUN BLD-MCNC: 15 MG/DL (ref 7–18)
BUN/CREAT SERPL: 16.7 (ref 10–20)
CALCIUM BLD-MCNC: 8.8 MG/DL (ref 8.5–10.1)
CHLORIDE SERPL-SCNC: 102 MMOL/L (ref 98–112)
CO2 SERPL-SCNC: 28 MMOL/L (ref 21–32)
CREAT BLD-MCNC: 0.9 MG/DL
DEPRECATED RDW RBC AUTO: 42.7 FL (ref 35.1–46.3)
EOSINOPHIL # BLD AUTO: 0 X10(3) UL (ref 0–0.7)
EOSINOPHIL NFR BLD AUTO: 0 %
ERYTHROCYTE [DISTWIDTH] IN BLOOD BY AUTOMATED COUNT: 13.1 % (ref 11–15)
FLUAV + FLUBV RNA SPEC NAA+PROBE: NEGATIVE
FLUAV + FLUBV RNA SPEC NAA+PROBE: POSITIVE
GLUCOSE BLD-MCNC: 95 MG/DL (ref 70–99)
HCT VFR BLD AUTO: 42.6 %
HGB BLD-MCNC: 14.1 G/DL
IMM GRANULOCYTES # BLD AUTO: 0.06 X10(3) UL (ref 0–1)
IMM GRANULOCYTES NFR BLD: 0.5 %
LYMPHOCYTES # BLD AUTO: 1.03 X10(3) UL (ref 1–4)
LYMPHOCYTES NFR BLD AUTO: 8 %
MCH RBC QN AUTO: 29.4 PG (ref 26–34)
MCHC RBC AUTO-ENTMCNC: 33.1 G/DL (ref 31–37)
MCV RBC AUTO: 88.8 FL
MONOCYTES # BLD AUTO: 1.11 X10(3) UL (ref 0.1–1)
MONOCYTES NFR BLD AUTO: 8.6 %
NEUTROPHILS # BLD AUTO: 10.7 X10 (3) UL (ref 1.5–7.7)
NEUTROPHILS # BLD AUTO: 10.7 X10(3) UL (ref 1.5–7.7)
NEUTROPHILS NFR BLD AUTO: 82.7 %
OSMOLALITY SERPL CALC.SUM OF ELEC: 283 MOSM/KG (ref 275–295)
PLATELET # BLD AUTO: 184 10(3)UL (ref 150–450)
POTASSIUM SERPL-SCNC: 3.4 MMOL/L (ref 3.5–5.1)
PROCALCITONIN SERPL-MCNC: 0.45 NG/ML (ref ?–0.16)
RBC # BLD AUTO: 4.8 X10(6)UL
RSV RNA SPEC NAA+PROBE: NEGATIVE
S PYO AG THROAT QL: POSITIVE
SARS-COV-2 RNA RESP QL NAA+PROBE: NOT DETECTED
SODIUM SERPL-SCNC: 136 MMOL/L (ref 136–145)
WBC # BLD AUTO: 12.9 X10(3) UL (ref 4–11)

## 2022-04-15 PROCEDURE — 80048 BASIC METABOLIC PNL TOTAL CA: CPT | Performed by: EMERGENCY MEDICINE

## 2022-04-15 PROCEDURE — 85025 COMPLETE CBC W/AUTO DIFF WBC: CPT | Performed by: EMERGENCY MEDICINE

## 2022-04-15 PROCEDURE — 36415 COLL VENOUS BLD VENIPUNCTURE: CPT

## 2022-04-15 PROCEDURE — 0241U SARS-COV-2/FLU A AND B/RSV BY PCR (GENEXPERT): CPT | Performed by: EMERGENCY MEDICINE

## 2022-04-15 PROCEDURE — 71045 X-RAY EXAM CHEST 1 VIEW: CPT

## 2022-04-15 PROCEDURE — 87880 STREP A ASSAY W/OPTIC: CPT

## 2022-04-15 PROCEDURE — 93010 ELECTROCARDIOGRAM REPORT: CPT | Performed by: INTERNAL MEDICINE

## 2022-04-15 PROCEDURE — 84145 PROCALCITONIN (PCT): CPT | Performed by: EMERGENCY MEDICINE

## 2022-04-15 PROCEDURE — 99284 EMERGENCY DEPT VISIT MOD MDM: CPT

## 2022-04-15 PROCEDURE — 93005 ELECTROCARDIOGRAM TRACING: CPT

## 2022-04-15 RX ORDER — ACETAMINOPHEN AND CODEINE PHOSPHATE 300; 30 MG/1; MG/1
1 TABLET ORAL EVERY 6 HOURS PRN
Qty: 10 TABLET | Refills: 0 | Status: SHIPPED | OUTPATIENT
Start: 2022-04-15 | End: 2022-04-20

## 2022-04-15 RX ORDER — OSELTAMIVIR PHOSPHATE 75 MG/1
75 CAPSULE ORAL 2 TIMES DAILY
Qty: 10 CAPSULE | Refills: 0 | Status: SHIPPED | OUTPATIENT
Start: 2022-04-15 | End: 2022-04-20

## 2022-04-15 RX ORDER — AMOXICILLIN 500 MG/1
500 TABLET, FILM COATED ORAL 3 TIMES DAILY
Qty: 30 TABLET | Refills: 0 | Status: SHIPPED | OUTPATIENT
Start: 2022-04-15 | End: 2022-04-25

## 2022-04-15 RX ORDER — ACETAMINOPHEN 500 MG
1000 TABLET ORAL ONCE
Status: COMPLETED | OUTPATIENT
Start: 2022-04-15 | End: 2022-04-15

## 2022-04-15 NOTE — ED QUICK NOTES
The patient is cleared for discharge per Emergency Department physician. Discharge instructions were reviewed with family and patient including when and how to follow up with healthcare providers and when to seek emergency care. Medication use was reviewed and prescription details were given per Emergency Department provider request.  Peripheral IV discontinued. The patient dressed self and ambulated to exit with steady gait.

## 2023-03-16 ENCOUNTER — HOSPITAL ENCOUNTER (OUTPATIENT)
Dept: BONE DENSITY | Age: 78
Discharge: HOME | End: 2023-03-16
Attending: FAMILY MEDICINE
Payer: MEDICARE

## 2023-03-16 ENCOUNTER — HOSPITAL ENCOUNTER (OUTPATIENT)
Dept: MAMMOGRAPHY | Age: 78
Discharge: HOME | End: 2023-03-16
Attending: FAMILY MEDICINE
Payer: MEDICARE

## 2023-03-16 DIAGNOSIS — Z12.31 SCREENING MAMMOGRAM FOR BREAST CANCER: ICD-10-CM

## 2023-03-16 DIAGNOSIS — M80.00XD AGE-RELATED OSTEOPOROSIS WITH CURRENT PATHOLOGICAL FRACTURE WITH ROUTINE HEALING, SUBSEQUENT ENCOUNTER: ICD-10-CM

## 2023-03-16 PROCEDURE — 77067 SCR MAMMO BI INCL CAD: CPT | Performed by: FAMILY MEDICINE

## 2023-03-16 PROCEDURE — 77080 DXA BONE DENSITY AXIAL: CPT | Performed by: FAMILY MEDICINE

## 2023-03-16 PROCEDURE — 77063 BREAST TOMOSYNTHESIS BI: CPT | Performed by: FAMILY MEDICINE

## 2023-09-01 ENCOUNTER — HOSPITAL ENCOUNTER (EMERGENCY)
Facility: HOSPITAL | Age: 78
Discharge: HOME | End: 2023-09-01
Attending: STUDENT IN AN ORGANIZED HEALTH CARE EDUCATION/TRAINING PROGRAM
Payer: MEDICARE

## 2023-09-01 ENCOUNTER — APPOINTMENT (OUTPATIENT)
Dept: GENERAL RADIOLOGY | Facility: HOSPITAL | Age: 78
End: 2023-09-01
Attending: STUDENT IN AN ORGANIZED HEALTH CARE EDUCATION/TRAINING PROGRAM
Payer: MEDICARE

## 2023-09-01 VITALS
HEART RATE: 66 BPM | BODY MASS INDEX: 29 KG/M2 | DIASTOLIC BLOOD PRESSURE: 86 MMHG | WEIGHT: 135 LBS | OXYGEN SATURATION: 97 % | TEMPERATURE: 98 F | SYSTOLIC BLOOD PRESSURE: 161 MMHG | RESPIRATION RATE: 19 BRPM

## 2023-09-01 DIAGNOSIS — R03.0 ELEVATED BLOOD PRESSURE READING: ICD-10-CM

## 2023-09-01 DIAGNOSIS — S46.812A TRAPEZIUS MUSCLE STRAIN, LEFT, INITIAL ENCOUNTER: ICD-10-CM

## 2023-09-01 DIAGNOSIS — M25.512 ACUTE PAIN OF LEFT SHOULDER: Primary | ICD-10-CM

## 2023-09-01 LAB
ATRIAL RATE: 71 BPM
P AXIS: 51 DEGREES
P-R INTERVAL: 154 MS
Q-T INTERVAL: 400 MS
QRS DURATION: 82 MS
QTC CALCULATION (BEZET): 434 MS
R AXIS: 9 DEGREES
T AXIS: 46 DEGREES
VENTRICULAR RATE: 71 BPM

## 2023-09-01 PROCEDURE — 99284 EMERGENCY DEPT VISIT MOD MDM: CPT

## 2023-09-01 PROCEDURE — 93005 ELECTROCARDIOGRAM TRACING: CPT

## 2023-09-01 PROCEDURE — 96372 THER/PROPH/DIAG INJ SC/IM: CPT

## 2023-09-01 PROCEDURE — 73030 X-RAY EXAM OF SHOULDER: CPT | Performed by: STUDENT IN AN ORGANIZED HEALTH CARE EDUCATION/TRAINING PROGRAM

## 2023-09-01 PROCEDURE — 93010 ELECTROCARDIOGRAM REPORT: CPT

## 2023-09-01 RX ORDER — LIDOCAINE 50 MG/G
1 PATCH TOPICAL EVERY 24 HOURS
Qty: 7 PATCH | Refills: 0 | Status: SHIPPED | OUTPATIENT
Start: 2023-09-01 | End: 2023-09-08

## 2023-09-01 RX ORDER — KETOROLAC TROMETHAMINE 15 MG/ML
15 INJECTION, SOLUTION INTRAMUSCULAR; INTRAVENOUS ONCE
Status: COMPLETED | OUTPATIENT
Start: 2023-09-01 | End: 2023-09-01

## 2023-09-01 RX ORDER — NAPROXEN 500 MG/1
500 TABLET ORAL 2 TIMES DAILY PRN
Qty: 10 TABLET | Refills: 0 | Status: SHIPPED | OUTPATIENT
Start: 2023-09-01 | End: 2023-09-06

## 2023-09-01 RX ORDER — ACETAMINOPHEN 500 MG
1000 TABLET ORAL ONCE
Status: COMPLETED | OUTPATIENT
Start: 2023-09-01 | End: 2023-09-01

## 2023-09-01 NOTE — DISCHARGE INSTRUCTIONS
Thank you for seeking care at [facility] Emergency Department. You have been seen and evaluated and noted to have elevated blood pressures. We reviewed the results from your visit in the emergency department. Please read the instructions provided   If provided, take prescriptions as instructed. You were found to have high blood pressure today and you need to have that rechecked by a primary doctor at a later date. Your physician may discuss lifestyle modifications including weight reduction, dietary sodium restriction, increased physical activity, and moderation in alcohol consumptions. If possible, please check your blood pressure at home and keep a blood pressure log to bring to your follow up visit with your physician. .  Please read the instructions provided, and if given prescriptions, take as instructed. Remember, your care process does not end after your visit today. Please follow-up with your doctor within 1-2 days for a follow-up check to ensure you are  improving, to see if you need any further evaluation/testing, or to evaluate for any alternate diagnoses. Please return to the emergency department if you develop chest pain, shortness of breath, headaches, numbness/tingling, weakness, changes in speech, or if you develop any other new or concerning symptoms as these could be signs of more serious medical illness. We hope you feel better.

## 2023-09-01 NOTE — ED INITIAL ASSESSMENT (HPI)
Patient complains of left shoulder radiated down left arm, denies chest pain, denies injury 8 (severe pain)

## 2023-09-13 ENCOUNTER — HOSPITAL ENCOUNTER (EMERGENCY)
Facility: HOSPITAL | Age: 78
Discharge: HOME | End: 2023-09-14
Attending: STUDENT IN AN ORGANIZED HEALTH CARE EDUCATION/TRAINING PROGRAM
Payer: MEDICARE

## 2023-09-13 DIAGNOSIS — R42 LIGHTHEADEDNESS: ICD-10-CM

## 2023-09-13 DIAGNOSIS — I10 HYPERTENSION, UNSPECIFIED TYPE: Primary | ICD-10-CM

## 2023-09-13 DIAGNOSIS — R11.2 NAUSEA AND VOMITING IN ADULT: ICD-10-CM

## 2023-09-13 LAB
ALBUMIN SERPL-MCNC: 3.9 G/DL (ref 3.4–5)
ALBUMIN/GLOB SERPL: 1.1 {RATIO} (ref 1–2)
ALP LIVER SERPL-CCNC: 76 U/L
ALT SERPL-CCNC: 30 U/L
ANION GAP SERPL CALC-SCNC: 8 MMOL/L (ref 0–18)
AST SERPL-CCNC: 24 U/L (ref 15–37)
BASOPHILS # BLD AUTO: 0.04 X10(3) UL (ref 0–0.2)
BASOPHILS NFR BLD AUTO: 0.4 %
BILIRUB SERPL-MCNC: 0.6 MG/DL (ref 0.1–2)
BUN BLD-MCNC: 17 MG/DL (ref 7–18)
BUN/CREAT SERPL: 23 (ref 10–20)
CALCIUM BLD-MCNC: 9.3 MG/DL (ref 8.5–10.1)
CHLORIDE SERPL-SCNC: 98 MMOL/L (ref 98–112)
CO2 SERPL-SCNC: 26 MMOL/L (ref 21–32)
CREAT BLD-MCNC: 0.74 MG/DL
DEPRECATED RDW RBC AUTO: 42.7 FL (ref 35.1–46.3)
EGFRCR SERPLBLD CKD-EPI 2021: 84 ML/MIN/1.73M2 (ref 60–?)
EOSINOPHIL # BLD AUTO: 0.13 X10(3) UL (ref 0–0.7)
EOSINOPHIL NFR BLD AUTO: 1.3 %
ERYTHROCYTE [DISTWIDTH] IN BLOOD BY AUTOMATED COUNT: 13.6 % (ref 11–15)
GLOBULIN PLAS-MCNC: 3.7 G/DL (ref 2.8–4.4)
GLUCOSE BLD-MCNC: 128 MG/DL (ref 70–99)
HCT VFR BLD AUTO: 43.7 %
HGB BLD-MCNC: 14.4 G/DL
IMM GRANULOCYTES # BLD AUTO: 0.05 X10(3) UL (ref 0–1)
IMM GRANULOCYTES NFR BLD: 0.5 %
LYMPHOCYTES # BLD AUTO: 3.21 X10(3) UL (ref 1–4)
LYMPHOCYTES NFR BLD AUTO: 33.1 %
MCH RBC QN AUTO: 28.3 PG (ref 26–34)
MCHC RBC AUTO-ENTMCNC: 33 G/DL (ref 31–37)
MCV RBC AUTO: 85.9 FL
MONOCYTES # BLD AUTO: 0.87 X10(3) UL (ref 0.1–1)
MONOCYTES NFR BLD AUTO: 9 %
NEUTROPHILS # BLD AUTO: 5.4 X10 (3) UL (ref 1.5–7.7)
NEUTROPHILS # BLD AUTO: 5.4 X10(3) UL (ref 1.5–7.7)
NEUTROPHILS NFR BLD AUTO: 55.7 %
OSMOLALITY SERPL CALC.SUM OF ELEC: 277 MOSM/KG (ref 275–295)
PLATELET # BLD AUTO: 260 10(3)UL (ref 150–450)
POTASSIUM SERPL-SCNC: 3.4 MMOL/L (ref 3.5–5.1)
PROT SERPL-MCNC: 7.6 G/DL (ref 6.4–8.2)
RBC # BLD AUTO: 5.09 X10(6)UL
SODIUM SERPL-SCNC: 132 MMOL/L (ref 136–145)
TROPONIN I HIGH SENSITIVITY: 7 NG/L
WBC # BLD AUTO: 9.7 X10(3) UL (ref 4–11)

## 2023-09-13 PROCEDURE — 99285 EMERGENCY DEPT VISIT HI MDM: CPT

## 2023-09-13 PROCEDURE — 96375 TX/PRO/DX INJ NEW DRUG ADDON: CPT

## 2023-09-13 PROCEDURE — 93010 ELECTROCARDIOGRAM REPORT: CPT

## 2023-09-13 PROCEDURE — 85025 COMPLETE CBC W/AUTO DIFF WBC: CPT | Performed by: STUDENT IN AN ORGANIZED HEALTH CARE EDUCATION/TRAINING PROGRAM

## 2023-09-13 PROCEDURE — 84484 ASSAY OF TROPONIN QUANT: CPT | Performed by: STUDENT IN AN ORGANIZED HEALTH CARE EDUCATION/TRAINING PROGRAM

## 2023-09-13 PROCEDURE — 96374 THER/PROPH/DIAG INJ IV PUSH: CPT

## 2023-09-13 PROCEDURE — 93005 ELECTROCARDIOGRAM TRACING: CPT

## 2023-09-13 PROCEDURE — 80053 COMPREHEN METABOLIC PANEL: CPT | Performed by: STUDENT IN AN ORGANIZED HEALTH CARE EDUCATION/TRAINING PROGRAM

## 2023-09-13 PROCEDURE — 96361 HYDRATE IV INFUSION ADD-ON: CPT

## 2023-09-13 PROCEDURE — 99284 EMERGENCY DEPT VISIT MOD MDM: CPT

## 2023-09-13 RX ORDER — MORPHINE SULFATE 2 MG/ML
2 INJECTION, SOLUTION INTRAMUSCULAR; INTRAVENOUS ONCE
Status: COMPLETED | OUTPATIENT
Start: 2023-09-13 | End: 2023-09-14

## 2023-09-13 RX ORDER — ONDANSETRON 2 MG/ML
4 INJECTION INTRAMUSCULAR; INTRAVENOUS ONCE
Status: COMPLETED | OUTPATIENT
Start: 2023-09-13 | End: 2023-09-14

## 2023-09-14 ENCOUNTER — APPOINTMENT (OUTPATIENT)
Dept: CT IMAGING | Facility: HOSPITAL | Age: 78
End: 2023-09-14
Attending: STUDENT IN AN ORGANIZED HEALTH CARE EDUCATION/TRAINING PROGRAM
Payer: MEDICARE

## 2023-09-14 VITALS
TEMPERATURE: 97 F | HEART RATE: 81 BPM | WEIGHT: 137 LBS | OXYGEN SATURATION: 96 % | RESPIRATION RATE: 20 BRPM | SYSTOLIC BLOOD PRESSURE: 170 MMHG | BODY MASS INDEX: 30 KG/M2 | DIASTOLIC BLOOD PRESSURE: 65 MMHG

## 2023-09-14 LAB
ATRIAL RATE: 87 BPM
BILIRUB UR QL: NEGATIVE
CLARITY UR: CLEAR
COLOR UR: COLORLESS
GLUCOSE UR-MCNC: NORMAL MG/DL
HGB UR QL STRIP.AUTO: NEGATIVE
LEUKOCYTE ESTERASE UR QL STRIP.AUTO: 75
NITRITE UR QL STRIP.AUTO: NEGATIVE
P AXIS: 55 DEGREES
P-R INTERVAL: 140 MS
PH UR: 7.5 [PH] (ref 5–8)
PROT UR-MCNC: NEGATIVE MG/DL
Q-T INTERVAL: 378 MS
QRS DURATION: 78 MS
QTC CALCULATION (BEZET): 454 MS
R AXIS: -1 DEGREES
SP GR UR STRIP: 1.01 (ref 1–1.03)
T AXIS: 5 DEGREES
TROPONIN I HIGH SENSITIVITY: 11 NG/L
UROBILINOGEN UR STRIP-ACNC: NORMAL
VENTRICULAR RATE: 87 BPM

## 2023-09-14 PROCEDURE — 87086 URINE CULTURE/COLONY COUNT: CPT | Performed by: STUDENT IN AN ORGANIZED HEALTH CARE EDUCATION/TRAINING PROGRAM

## 2023-09-14 PROCEDURE — 81001 URINALYSIS AUTO W/SCOPE: CPT | Performed by: STUDENT IN AN ORGANIZED HEALTH CARE EDUCATION/TRAINING PROGRAM

## 2023-09-14 PROCEDURE — 84484 ASSAY OF TROPONIN QUANT: CPT | Performed by: STUDENT IN AN ORGANIZED HEALTH CARE EDUCATION/TRAINING PROGRAM

## 2023-09-14 PROCEDURE — 71260 CT THORAX DX C+: CPT | Performed by: STUDENT IN AN ORGANIZED HEALTH CARE EDUCATION/TRAINING PROGRAM

## 2023-09-14 PROCEDURE — 70450 CT HEAD/BRAIN W/O DYE: CPT | Performed by: STUDENT IN AN ORGANIZED HEALTH CARE EDUCATION/TRAINING PROGRAM

## 2023-09-14 RX ORDER — METOCLOPRAMIDE HYDROCHLORIDE 5 MG/ML
10 INJECTION INTRAMUSCULAR; INTRAVENOUS ONCE
Status: COMPLETED | OUTPATIENT
Start: 2023-09-14 | End: 2023-09-14

## 2023-09-14 RX ORDER — MECLIZINE HYDROCHLORIDE 25 MG/1
25 TABLET ORAL ONCE
Status: DISCONTINUED | OUTPATIENT
Start: 2023-09-14 | End: 2023-09-14

## 2023-09-14 RX ORDER — ONDANSETRON 4 MG/1
4 TABLET, ORALLY DISINTEGRATING ORAL EVERY 4 HOURS PRN
Qty: 10 TABLET | Refills: 0 | Status: SHIPPED | OUTPATIENT
Start: 2023-09-14 | End: 2023-09-21

## 2023-09-14 RX ORDER — METOCLOPRAMIDE HYDROCHLORIDE 5 MG/ML
INJECTION INTRAMUSCULAR; INTRAVENOUS
Status: COMPLETED
Start: 2023-09-14 | End: 2023-09-14

## 2023-09-14 RX ORDER — AMLODIPINE BESYLATE 5 MG/1
5 TABLET ORAL DAILY
Qty: 30 TABLET | Refills: 0 | Status: SHIPPED | OUTPATIENT
Start: 2023-09-14 | End: 2023-09-20

## 2023-09-17 ENCOUNTER — HOSPITAL ENCOUNTER (EMERGENCY)
Facility: HOSPITAL | Age: 78
Discharge: HOME | End: 2023-09-17
Attending: EMERGENCY MEDICINE
Payer: MEDICARE

## 2023-09-17 ENCOUNTER — APPOINTMENT (OUTPATIENT)
Dept: GENERAL RADIOLOGY | Facility: HOSPITAL | Age: 78
End: 2023-09-17
Attending: EMERGENCY MEDICINE
Payer: MEDICARE

## 2023-09-17 ENCOUNTER — APPOINTMENT (OUTPATIENT)
Dept: CT IMAGING | Facility: HOSPITAL | Age: 78
End: 2023-09-17
Attending: EMERGENCY MEDICINE
Payer: MEDICARE

## 2023-09-17 ENCOUNTER — HOSPITAL ENCOUNTER (OUTPATIENT)
Facility: HOSPITAL | Age: 78
Setting detail: OBSERVATION
LOS: 1 days | Discharge: HOME | End: 2023-09-20
Attending: EMERGENCY MEDICINE | Admitting: STUDENT IN AN ORGANIZED HEALTH CARE EDUCATION/TRAINING PROGRAM
Payer: MEDICARE

## 2023-09-17 ENCOUNTER — APPOINTMENT (OUTPATIENT)
Dept: MRI IMAGING | Facility: HOSPITAL | Age: 78
End: 2023-09-17
Attending: EMERGENCY MEDICINE
Payer: MEDICARE

## 2023-09-17 VITALS
RESPIRATION RATE: 15 BRPM | TEMPERATURE: 98 F | WEIGHT: 137 LBS | SYSTOLIC BLOOD PRESSURE: 172 MMHG | HEART RATE: 67 BPM | HEIGHT: 58 IN | DIASTOLIC BLOOD PRESSURE: 61 MMHG | BODY MASS INDEX: 28.76 KG/M2 | OXYGEN SATURATION: 96 %

## 2023-09-17 DIAGNOSIS — M54.12 ACUTE CERVICAL RADICULOPATHY: Primary | ICD-10-CM

## 2023-09-17 DIAGNOSIS — I10 HYPERTENSION, UNSPECIFIED TYPE: ICD-10-CM

## 2023-09-17 DIAGNOSIS — I10 HYPERTENSION, UNSPECIFIED TYPE: Primary | ICD-10-CM

## 2023-09-17 DIAGNOSIS — M54.12 CERVICAL RADICULOPATHY: ICD-10-CM

## 2023-09-17 DIAGNOSIS — R20.2 PARESTHESIA: ICD-10-CM

## 2023-09-17 LAB
ANION GAP SERPL CALC-SCNC: 4 MMOL/L (ref 0–18)
ANION GAP SERPL CALC-SCNC: 5 MMOL/L (ref 0–18)
ATRIAL RATE: 84 BPM
BASOPHILS # BLD AUTO: 0.02 X10(3) UL (ref 0–0.2)
BASOPHILS # BLD AUTO: 0.03 X10(3) UL (ref 0–0.2)
BASOPHILS NFR BLD AUTO: 0.2 %
BASOPHILS NFR BLD AUTO: 0.3 %
BUN BLD-MCNC: 21 MG/DL (ref 7–18)
BUN BLD-MCNC: 24 MG/DL (ref 7–18)
BUN/CREAT SERPL: 30.9 (ref 10–20)
BUN/CREAT SERPL: 32.9 (ref 10–20)
CALCIUM BLD-MCNC: 9.5 MG/DL (ref 8.5–10.1)
CALCIUM BLD-MCNC: 9.5 MG/DL (ref 8.5–10.1)
CHLORIDE SERPL-SCNC: 103 MMOL/L (ref 98–112)
CHLORIDE SERPL-SCNC: 104 MMOL/L (ref 98–112)
CO2 SERPL-SCNC: 29 MMOL/L (ref 21–32)
CO2 SERPL-SCNC: 31 MMOL/L (ref 21–32)
CREAT BLD-MCNC: 0.68 MG/DL
CREAT BLD-MCNC: 0.73 MG/DL
DEPRECATED RDW RBC AUTO: 43.6 FL (ref 35.1–46.3)
DEPRECATED RDW RBC AUTO: 43.9 FL (ref 35.1–46.3)
EGFRCR SERPLBLD CKD-EPI 2021: 86 ML/MIN/1.73M2 (ref 60–?)
EGFRCR SERPLBLD CKD-EPI 2021: 91 ML/MIN/1.73M2 (ref 60–?)
EOSINOPHIL # BLD AUTO: 0.17 X10(3) UL (ref 0–0.7)
EOSINOPHIL # BLD AUTO: 0.19 X10(3) UL (ref 0–0.7)
EOSINOPHIL NFR BLD AUTO: 2 %
EOSINOPHIL NFR BLD AUTO: 2.1 %
ERYTHROCYTE [DISTWIDTH] IN BLOOD BY AUTOMATED COUNT: 13.8 % (ref 11–15)
ERYTHROCYTE [DISTWIDTH] IN BLOOD BY AUTOMATED COUNT: 13.8 % (ref 11–15)
GLUCOSE BLD-MCNC: 112 MG/DL (ref 70–99)
GLUCOSE BLD-MCNC: 113 MG/DL (ref 70–99)
GLUCOSE BLDC GLUCOMTR-MCNC: 120 MG/DL (ref 70–99)
HCT VFR BLD AUTO: 39.4 %
HCT VFR BLD AUTO: 40.2 %
HGB BLD-MCNC: 13 G/DL
HGB BLD-MCNC: 13.1 G/DL
IMM GRANULOCYTES # BLD AUTO: 0.02 X10(3) UL (ref 0–1)
IMM GRANULOCYTES # BLD AUTO: 0.03 X10(3) UL (ref 0–1)
IMM GRANULOCYTES NFR BLD: 0.2 %
IMM GRANULOCYTES NFR BLD: 0.3 %
LYMPHOCYTES # BLD AUTO: 1.81 X10(3) UL (ref 1–4)
LYMPHOCYTES # BLD AUTO: 1.95 X10(3) UL (ref 1–4)
LYMPHOCYTES NFR BLD AUTO: 19.4 %
LYMPHOCYTES NFR BLD AUTO: 24.2 %
MCH RBC QN AUTO: 28.3 PG (ref 26–34)
MCH RBC QN AUTO: 28.6 PG (ref 26–34)
MCHC RBC AUTO-ENTMCNC: 32.6 G/DL (ref 31–37)
MCHC RBC AUTO-ENTMCNC: 33 G/DL (ref 31–37)
MCV RBC AUTO: 86.6 FL
MCV RBC AUTO: 86.8 FL
MONOCYTES # BLD AUTO: 0.84 X10(3) UL (ref 0.1–1)
MONOCYTES # BLD AUTO: 0.88 X10(3) UL (ref 0.1–1)
MONOCYTES NFR BLD AUTO: 10.9 %
MONOCYTES NFR BLD AUTO: 9 %
NEUTROPHILS # BLD AUTO: 5.01 X10 (3) UL (ref 1.5–7.7)
NEUTROPHILS # BLD AUTO: 5.01 X10(3) UL (ref 1.5–7.7)
NEUTROPHILS # BLD AUTO: 6.44 X10 (3) UL (ref 1.5–7.7)
NEUTROPHILS # BLD AUTO: 6.44 X10(3) UL (ref 1.5–7.7)
NEUTROPHILS NFR BLD AUTO: 62.4 %
NEUTROPHILS NFR BLD AUTO: 69 %
OSMOLALITY SERPL CALC.SUM OF ELEC: 290 MOSM/KG (ref 275–295)
OSMOLALITY SERPL CALC.SUM OF ELEC: 291 MOSM/KG (ref 275–295)
P AXIS: 40 DEGREES
P-R INTERVAL: 132 MS
PLATELET # BLD AUTO: 253 10(3)UL (ref 150–450)
PLATELET # BLD AUTO: 256 10(3)UL (ref 150–450)
POTASSIUM SERPL-SCNC: 3.6 MMOL/L (ref 3.5–5.1)
POTASSIUM SERPL-SCNC: 4.2 MMOL/L (ref 3.5–5.1)
Q-T INTERVAL: 368 MS
QRS DURATION: 76 MS
QTC CALCULATION (BEZET): 434 MS
R AXIS: 2 DEGREES
RBC # BLD AUTO: 4.55 X10(6)UL
RBC # BLD AUTO: 4.63 X10(6)UL
SODIUM SERPL-SCNC: 138 MMOL/L (ref 136–145)
SODIUM SERPL-SCNC: 138 MMOL/L (ref 136–145)
T AXIS: 25 DEGREES
TROPONIN I HIGH SENSITIVITY: 10 NG/L
TROPONIN I HIGH SENSITIVITY: 9 NG/L
VENTRICULAR RATE: 84 BPM
WBC # BLD AUTO: 8.1 X10(3) UL (ref 4–11)
WBC # BLD AUTO: 9.3 X10(3) UL (ref 4–11)

## 2023-09-17 PROCEDURE — 70551 MRI BRAIN STEM W/O DYE: CPT | Performed by: EMERGENCY MEDICINE

## 2023-09-17 PROCEDURE — 93005 ELECTROCARDIOGRAM TRACING: CPT

## 2023-09-17 PROCEDURE — 85025 COMPLETE CBC W/AUTO DIFF WBC: CPT | Performed by: EMERGENCY MEDICINE

## 2023-09-17 PROCEDURE — 84484 ASSAY OF TROPONIN QUANT: CPT | Performed by: EMERGENCY MEDICINE

## 2023-09-17 PROCEDURE — 70450 CT HEAD/BRAIN W/O DYE: CPT | Performed by: EMERGENCY MEDICINE

## 2023-09-17 PROCEDURE — 71045 X-RAY EXAM CHEST 1 VIEW: CPT | Performed by: EMERGENCY MEDICINE

## 2023-09-17 PROCEDURE — 80048 BASIC METABOLIC PNL TOTAL CA: CPT | Performed by: EMERGENCY MEDICINE

## 2023-09-17 RX ORDER — CLOPIDOGREL BISULFATE 75 MG/1
75 TABLET ORAL ONCE
Status: COMPLETED | OUTPATIENT
Start: 2023-09-17 | End: 2023-09-18

## 2023-09-17 RX ORDER — ASPIRIN 81 MG/1
81 TABLET, CHEWABLE ORAL ONCE
Status: COMPLETED | OUTPATIENT
Start: 2023-09-17 | End: 2023-09-18

## 2023-09-17 RX ORDER — METOPROLOL TARTRATE 5 MG/5ML
5 INJECTION INTRAVENOUS ONCE
Status: COMPLETED | OUTPATIENT
Start: 2023-09-17 | End: 2023-09-17

## 2023-09-17 RX ORDER — TIMOLOL MALEATE 5 MG/ML
1 SOLUTION/ DROPS OPHTHALMIC ONCE
Status: DISCONTINUED | OUTPATIENT
Start: 2023-09-17 | End: 2023-09-17

## 2023-09-17 RX ORDER — METOPROLOL SUCCINATE 25 MG/1
25 TABLET, EXTENDED RELEASE ORAL DAILY
Qty: 30 TABLET | Refills: 0 | Status: SHIPPED | OUTPATIENT
Start: 2023-09-17

## 2023-09-17 NOTE — ED QUICK NOTES
Pt presents to ed with c/o HTN. Per pt granddaughter, pt was prescribed pain medication for her shoulder pain on Wednesday. Pt grandaughter states pt developed high blood pressure after and was told to stop the pain medication. Pt reports chest heaviness. Denies SOB. Denies shoulder pain.       Interp services offered, pt declined and requested granddaughter to translate

## 2023-09-17 NOTE — ED INITIAL ASSESSMENT (HPI)
Pt to ed c/o hypertension. Pt states she started to take bp meds with no relief. Pt states she has tightness to chest and headache.

## 2023-09-17 NOTE — ED QUICK NOTES
Pt given discharge instructions. Pt and pt daughter verbalized understanding. IV out. Pt Aox4. Vital signs stable.  Pt dressed self and ambulated out of ER with steady gait with grandaughter

## 2023-09-18 ENCOUNTER — APPOINTMENT (OUTPATIENT)
Dept: CV DIAGNOSTICS | Facility: HOSPITAL | Age: 78
End: 2023-09-18
Attending: STUDENT IN AN ORGANIZED HEALTH CARE EDUCATION/TRAINING PROGRAM
Payer: MEDICARE

## 2023-09-18 ENCOUNTER — PATIENT OUTREACH (OUTPATIENT)
Dept: CASE MANAGEMENT | Age: 78
End: 2023-09-18

## 2023-09-18 ENCOUNTER — APPOINTMENT (OUTPATIENT)
Dept: CT IMAGING | Facility: HOSPITAL | Age: 78
End: 2023-09-18
Attending: Other
Payer: MEDICARE

## 2023-09-18 PROBLEM — M54.12 ACUTE CERVICAL RADICULOPATHY: Status: ACTIVE | Noted: 2023-09-17

## 2023-09-18 PROBLEM — M54.12 CERVICAL RADICULOPATHY: Status: ACTIVE | Noted: 2023-09-18

## 2023-09-18 PROBLEM — I10 HYPERTENSION, UNSPECIFIED TYPE: Status: ACTIVE | Noted: 2023-09-18

## 2023-09-18 LAB
ATRIAL RATE: 77 BPM
CHOLEST SERPL-MCNC: 176 MG/DL (ref ?–200)
EST. AVERAGE GLUCOSE BLD GHB EST-MCNC: 128 MG/DL (ref 68–126)
GLUCOSE BLDC GLUCOMTR-MCNC: 107 MG/DL (ref 70–99)
GLUCOSE BLDC GLUCOMTR-MCNC: 78 MG/DL (ref 70–99)
GLUCOSE BLDC GLUCOMTR-MCNC: 95 MG/DL (ref 70–99)
GLUCOSE BLDC GLUCOMTR-MCNC: 96 MG/DL (ref 70–99)
HBA1C MFR BLD: 6.1 % (ref ?–5.7)
HDLC SERPL-MCNC: 64 MG/DL (ref 40–59)
LDLC SERPL CALC-MCNC: 94 MG/DL (ref ?–100)
NONHDLC SERPL-MCNC: 112 MG/DL (ref ?–130)
P AXIS: 29 DEGREES
P-R INTERVAL: 142 MS
POTASSIUM SERPL-SCNC: 5.3 MMOL/L (ref 3.5–5.1)
Q-T INTERVAL: 382 MS
QRS DURATION: 80 MS
QTC CALCULATION (BEZET): 432 MS
R AXIS: 2 DEGREES
T AXIS: 13 DEGREES
TRIGL SERPL-MCNC: 98 MG/DL (ref 30–149)
VENTRICULAR RATE: 77 BPM
VLDLC SERPL CALC-MCNC: 16 MG/DL (ref 0–30)

## 2023-09-18 PROCEDURE — 70498 CT ANGIOGRAPHY NECK: CPT | Performed by: OTHER

## 2023-09-18 PROCEDURE — 70496 CT ANGIOGRAPHY HEAD: CPT | Performed by: OTHER

## 2023-09-18 PROCEDURE — 99223 1ST HOSP IP/OBS HIGH 75: CPT | Performed by: OTHER

## 2023-09-18 PROCEDURE — 93306 TTE W/DOPPLER COMPLETE: CPT | Performed by: STUDENT IN AN ORGANIZED HEALTH CARE EDUCATION/TRAINING PROGRAM

## 2023-09-18 RX ORDER — METOPROLOL SUCCINATE 25 MG/1
25 TABLET, EXTENDED RELEASE ORAL DAILY
Status: DISCONTINUED | OUTPATIENT
Start: 2023-09-18 | End: 2023-09-20

## 2023-09-18 RX ORDER — ASPIRIN 81 MG/1
81 TABLET, CHEWABLE ORAL DAILY
Status: DISCONTINUED | OUTPATIENT
Start: 2023-09-18 | End: 2023-09-18

## 2023-09-18 RX ORDER — POTASSIUM CHLORIDE 20 MEQ/1
40 TABLET, EXTENDED RELEASE ORAL EVERY 4 HOURS
Status: COMPLETED | OUTPATIENT
Start: 2023-09-18 | End: 2023-09-18

## 2023-09-18 RX ORDER — SODIUM CHLORIDE 9 MG/ML
INJECTION, SOLUTION INTRAVENOUS CONTINUOUS
Status: DISCONTINUED | OUTPATIENT
Start: 2023-09-18 | End: 2023-09-18

## 2023-09-18 RX ORDER — ACETAMINOPHEN 650 MG/1
650 SUPPOSITORY RECTAL EVERY 4 HOURS PRN
Status: DISCONTINUED | OUTPATIENT
Start: 2023-09-18 | End: 2023-09-20

## 2023-09-18 RX ORDER — ONDANSETRON 2 MG/ML
4 INJECTION INTRAMUSCULAR; INTRAVENOUS EVERY 6 HOURS PRN
Status: DISCONTINUED | OUTPATIENT
Start: 2023-09-18 | End: 2023-09-18

## 2023-09-18 RX ORDER — PANTOPRAZOLE SODIUM 40 MG/1
40 TABLET, DELAYED RELEASE ORAL
Status: DISCONTINUED | OUTPATIENT
Start: 2023-09-18 | End: 2023-09-20

## 2023-09-18 RX ORDER — LABETALOL HYDROCHLORIDE 5 MG/ML
10 INJECTION, SOLUTION INTRAVENOUS EVERY 10 MIN PRN
Status: DISCONTINUED | OUTPATIENT
Start: 2023-09-18 | End: 2023-09-20

## 2023-09-18 RX ORDER — HEPARIN SODIUM 5000 [USP'U]/ML
5000 INJECTION, SOLUTION INTRAVENOUS; SUBCUTANEOUS EVERY 12 HOURS SCHEDULED
Status: DISCONTINUED | OUTPATIENT
Start: 2023-09-18 | End: 2023-09-20

## 2023-09-18 RX ORDER — HEPARIN SODIUM 5000 [USP'U]/ML
5000 INJECTION, SOLUTION INTRAVENOUS; SUBCUTANEOUS EVERY 12 HOURS SCHEDULED
Status: DISCONTINUED | OUTPATIENT
Start: 2023-09-18 | End: 2023-09-18

## 2023-09-18 RX ORDER — PREGABALIN 25 MG/1
25 CAPSULE ORAL 2 TIMES DAILY
Status: DISCONTINUED | OUTPATIENT
Start: 2023-09-18 | End: 2023-09-20

## 2023-09-18 RX ORDER — ACETAMINOPHEN 325 MG/1
650 TABLET ORAL EVERY 4 HOURS PRN
Status: DISCONTINUED | OUTPATIENT
Start: 2023-09-18 | End: 2023-09-20

## 2023-09-18 RX ORDER — SODIUM CHLORIDE 9 MG/ML
INJECTION, SOLUTION INTRAVENOUS CONTINUOUS
Status: ACTIVE | OUTPATIENT
Start: 2023-09-18 | End: 2023-09-18

## 2023-09-18 RX ORDER — ATORVASTATIN CALCIUM 80 MG/1
80 TABLET, FILM COATED ORAL NIGHTLY
Status: DISCONTINUED | OUTPATIENT
Start: 2023-09-18 | End: 2023-09-18

## 2023-09-18 RX ORDER — AMLODIPINE BESYLATE 5 MG/1
5 TABLET ORAL DAILY
Status: DISCONTINUED | OUTPATIENT
Start: 2023-09-18 | End: 2023-09-20

## 2023-09-18 RX ORDER — CLOPIDOGREL BISULFATE 75 MG/1
75 TABLET ORAL DAILY
Status: DISCONTINUED | OUTPATIENT
Start: 2023-09-18 | End: 2023-09-18

## 2023-09-18 RX ORDER — HYDRALAZINE HYDROCHLORIDE 20 MG/ML
10 INJECTION INTRAMUSCULAR; INTRAVENOUS EVERY 2 HOUR PRN
Status: DISCONTINUED | OUTPATIENT
Start: 2023-09-18 | End: 2023-09-20

## 2023-09-18 RX ORDER — METOCLOPRAMIDE HYDROCHLORIDE 5 MG/ML
10 INJECTION INTRAMUSCULAR; INTRAVENOUS EVERY 8 HOURS PRN
Status: DISCONTINUED | OUTPATIENT
Start: 2023-09-18 | End: 2023-09-20

## 2023-09-18 NOTE — ED QUICK NOTES
Orders for admission, patient is aware of plan and ready to go upstairs. Any questions, please call ED RN [facility] at extension [phone]. Patient Covid vaccination status: Fully vaccinated     COVID Test Ordered in ED: None    COVID Suspicion at Admission: N/A    Running Infusions:  None    Mental Status/LOC at time of transport: A&Ox4    Other pertinent information:  Patient mainly [address] speaking. Family at bedside.    CIWA score: N/A   NIH score:  2

## 2023-09-18 NOTE — ED INITIAL ASSESSMENT (HPI)
Patient presents to ED with htn and tingling to L side. Per daughter patient is more fatigued and has been complaining of L arm hurting. Patient was seen here 4 times for same thing. Per daughter, patients' BP has only increased despite getting medications for it yesterday. Patient is also having CP. No facial droop noted. Weakness noted to L side.

## 2023-09-18 NOTE — PLAN OF CARE
Problem: Patient Centered Care  Goal: Patient preferences are identified and integrated in the patient's plan of care  Description: Interventions:  - Provide timely, complete, and accurate information to patient/family  - Incorporate patient and family knowledge, values, beliefs, and cultural backgrounds into the planning and delivery of care  - Encourage patient/family to participate in care and decision-making at the level they choose  - Honor patient and family perspectives and choices  Outcome: Progressing     Problem: Patient/Family Goals  Goal: Patient/Family Long Term Goal  Description: Patient's Long Term Goal: Go home    Interventions:  - VS  - Neuro assessments  -NIH   - Labs  - CT  -MRI  - See additional Care Plan goals for specific interventions  Outcome: Progressing  Goal: Patient/Family Short Term Goal  Description: Patient's Short Term Goal: Feel better and control my blood pressure    Interventions:   - VS  - Follow care plan   - See additional Care Plan goals for specific interventions  Outcome: Progressing     Problem: PAIN - ADULT  Goal: Verbalizes/displays adequate comfort level or patient's stated pain goal  Description: INTERVENTIONS:  - Encourage pt to monitor pain and request assistance  - Assess pain using appropriate pain scale  - Administer analgesics based on type and severity of pain and evaluate response  - Implement non-pharmacological measures as appropriate and evaluate response  - Consider cultural and social influences on pain and pain management  - Manage/alleviate anxiety  - Utilize distraction and/or relaxation techniques  - Monitor for opioid side effects  - Notify MD/LIP if interventions unsuccessful or patient reports new pain  - Anticipate increased pain with activity and pre-medicate as appropriate  Outcome: Progressing     Problem: NEUROLOGICAL - ADULT  Goal: Achieves stable or improved neurological status  Description: INTERVENTIONS  - Assess for and report changes in neurological status  - Initiate measures to prevent increased intracranial pressure  - Maintain blood pressure and fluid volume within ordered parameters to optimize cerebral perfusion and minimize risk of hemorrhage  - Monitor temperature, glucose, and sodium. Initiate appropriate interventions as ordered  Outcome: Progressing  Goal: Absence of seizures  Description: INTERVENTIONS  - Monitor for seizure activity  - Administer anti-seizure medications as ordered  - Monitor neurological status  Outcome: Progressing  Goal: Remains free of injury related to seizure activity  Description: INTERVENTIONS:  - Maintain airway, patient safety  and administer oxygen as ordered  - Monitor patient for seizure activity, document and report duration and description of seizure to MD/LIP  - If seizure occurs, turn patient to side and suction secretions as needed  - Reorient patient post seizure  - Seizure pads on all 4 side rails  - Instruct patient/family to notify RN of any seizure activity  - Instruct patient/family to call for assistance with activity based on assessment  Outcome: Progressing  Goal: Achieves maximal functionality and self care  Description: INTERVENTIONS  - Monitor swallowing and airway patency with patient fatigue and changes in neurological status  - Encourage and assist patient to increase activity and self care with guidance from PT/OT  - Encourage visually impaired, hearing impaired and aphasic patients to use assistive/communication devices  Outcome: Progressing     Patient a/ox4. Room air. Primarily [place] speaking. Plan for neuro assessments per orders. Maintained on IVF's.

## 2023-09-18 NOTE — CM/SW NOTE
09/18/23 0805   CM/SW Referral Data   Referral Source Physician   Reason for Referral Discharge planning   Informant Patient; Son      Reason for  Pt. is Limited English Proficient   Limited English- Service Selected   (#[id number])   Medical Hx   Does patient have an established PCP? Yes  Maisha Tapia)   Patient Info   Patient's Current Mental Status at Time of Assessment Alert;Oriented   Patient's Home Environment Condo/Apt no elevator   Patient lives with Son   Patient Status Prior to Admission   Independent with ADLs and Mobility Yes   Discharge Needs   Anticipated D/C needs To be determined     Pt discussed during nursing rounds. Dx possible CVA. Home w/son, independent w/o device at baseline. ST/PT/OT evals needed for dc recommendation, RN is aware. 1820: PT/OT recommending outpatient PT at dc. Script for outpatient PT will be needed prior to dc. Plan: Home w/son with outpatient PT pending medical clearance. / to remain available for support and/or discharge planning.      OSWALD Frank    [phone]

## 2023-09-18 NOTE — H&P
[facility]    History & Physical    Dee Carrillo Patient Status:  Emergency    10/10/1945 MRN [id number]   Location [address] Attending Kari Prader, MD   Hosp Day # 0 PCP Inez Jeronimo MD     Date:  2023  Date of Admission:  2023    Chief Complaint:  Patient presents with:  Hypertension      History of Present Illness:  Dee Carrillo is a(n) 78year old female who presents for evaluation of elevated blood pressure as well as left side tingling. Patient is Yoruba-speaking, history obtained by daughter at bedside. Daughter reports that patient was seen in the emergency department last evening and was evaluated for elevated blood pressure. She was discharged home on metoprolol. Patient has had shoulder pain that started again today and the left arm numbness as well as left lower extremity numbness but she denies weakness/tingling. The symptoms started about 3 hours prior to presentation. Daughter was able to take her blood pressure at home and reported it was in the 190s. Patient denies any right-sided weakness/numbness/tingling. Of note, patient presented to the ED on 2023 for evaluation of left shoulder pain and was given Tylenol/lidocaine patch. She then presented again on 2023 with the same symptoms this time was having lightheadedness, dizziness, nausea. She was noted to have elevated blood pressure and was discharged on amlodipine. Patient again presented on 2023 and was treated for elevated blood pressure and discharged on metoprolol. Patient denies chest pain or shortness of breath. Denies change in vision. Denies headache    Initial vital signs in the ED significant for blood pressure 193/80, CT head did not reveal any intracranial bleed or visible infarct.   MRI brain was done which revealed \" tiny focus of DWI hyperintensity in the right sided medial temporal lobe, possible artifact but suspicious for local restricted diffusion of a tiny acute to early subacute infarction. Neurology was consulted (Dr. Audree Cheadle) notified. Admitted under hospitalist service for further evaluation and management    History:  Past Medical History:   Diagnosis Date    Anesthesia complication     Arthritis     Cataract     Esophageal reflux     PONV (postoperative nausea and vomiting)      Past Surgical History:   Procedure Laterality Date    ARTHROSCOPY OF JOINT UNLISTED Left     knee    CATARACT      COLONOSCOPY N/A 1/20/2021    Procedure: COLONOSCOPY;  Surgeon: Malia Harp MD;  Location: [facility] ENDO    KNEE ARTHROSCOPY Left 2002    right knee arthroscopy     Family History   Family history unknown: Yes      reports that she has never smoked. She has never used smokeless tobacco. She reports that she does not drink alcohol and does not use drugs. Allergies:  No Known Allergies    Home Medications:  Prior to Admission Medications   Prescriptions Last Dose Informant Patient Reported? Taking? ALENDRONATE 70 MG Oral Tab   No No   Sig: TAKE 1 TABLET (70 MG TOTAL) BY MOUTH ONCE A WEEK   CELECOXIB 200 MG Oral Cap   No No   Sig: TAKE 1 CAPSULE BY MOUTH TWICE A DAY   OMEPRAZOLE 20 MG Oral Capsule Delayed Release   No No   Sig: TAKE 1 CAPSULE BY MOUTH EVERY DAY   Pseudoephedrine-Codeine-GG (CHERATUSSIN DAC) 30- MG/5ML Oral Solution   No No   Sig: Take 10 mL by mouth 4 (four) times daily as needed for cough. acetaminophen-codeine (TYLENOL WITH CODEINE #3) 300-30 MG Oral Tab   No No   Sig: Take 1 tablet by mouth every 6 (six) hours as needed for Pain. amLODIPine 5 MG Oral Tab   No No   Sig: Take 1 tablet (5 mg total) by mouth daily. lidocaine 5 % External Patch   No No   Sig: Place 1 patch onto the skin daily for 7 days. methylPREDNISolone (MEDROL) 4 MG Oral Tablet Therapy Pack   No No   Sig: As directed. metoprolol succinate ER 25 MG Oral Tablet 24 Hr   No No   Sig: Take 1 tablet (25 mg total) by mouth daily.    naproxen 500 MG Oral Tab No No   Sig: Take 1 tablet (500 mg total) by mouth 2 (two) times daily as needed. ondansetron 4 MG Oral Tablet Dispersible   No No   Sig: Take 1 tablet (4 mg total) by mouth every 4 (four) hours as needed for Nausea. Facility-Administered Medications: None       Review of Systems:  Constitutional: Negative  Eye:  Negative. Ear/Nose/Mouth/Throat:  Negative. Respiratory:  Negative  Cardiovascular: Negative  Gastrointestinal:  Negative. Genitourinary:  Negative  Endocrine:  Negative. Immunologic:  Negative. Musculoskeletal:  + Left arm numbness, left shoulder pain  Integumentary:  Negative. Neurologic:  Negative. Psychiatric:  Negative. ROS reviewed as documented in chart    Physical Exam:  Pulse:  [62-88] 77  Resp:  [15-25] 23  BP: (155-193)/(57-92) 167/79  SpO2:  [92 %-99 %] 95 %    General:  Alert and oriented. Diffuse skin problem:  None. Eye:  Pupils are equal, round and reactive to light, extraocular movements are intact, Normal conjunctiva. HENT:  Normocephalic, oral mucosa is moist.  Head:  Normocephalic, atraumatic. Neck:  Supple, non-tender, no carotid bruit, no jugular venous distention, no lymphadenopathy, no thyromegaly. Respiratory:  Lungs are clear to auscultation, respirations are non-labored, breath sounds are equal, symmetrical chest wall expansion. Cardiovascular:  Normal rate, regular rhythm, no murmur, no edema. Gastrointestinal:  Soft, non-tender, non-distended, normal bowel sounds, no organomegaly. Lymphatics:  No lymphadenopathy neck, axilla, groin. Musculoskeletal: Normal range of motion. normal strength. Feet:  Normal pulses. Neurologic:  Alert, oriented, no focal deficits, cranial nerves II-XII are grossly intact. Cognition and Speech:  Oriented, speech clear and coherent. Psychiatric:  Cooperative, appropriate mood & affect.       Laboratory Data:   Lab Results   Component Value Date    WBC 9.3 09/17/2023    HGB 13.0 09/17/2023    HCT 39.4 09/17/2023    PLT 256.0 09/17/2023    CREATSERUM 0.73 09/17/2023    BUN 24 09/17/2023     09/17/2023    K 3.6 09/17/2023     09/17/2023    CO2 29.0 09/17/2023     09/17/2023    CA 9.5 09/17/2023       Imaging:  No results found. Assessment and Plan:    Stroke, acute. Affecting right-sided medial temporal lobe. Imaging is consistent with stroke. Clinical findings include left arm numbness. Risk factors include hypertension. Unclear etiology of the stroke, no history of atrial fibrillation or any other arrhythmia.  -Antiplatelets/anticoagulation: Patient was given aspirin 81 mg/Plavix 75 mg in the ED. We will continue aspirin and Plavix daily. -CTA head/neck is pending  -Maintain n.p.o., pending neurology evaluation. Will initiate gentle IV fluid hydration at 75 cc/h  -Check hemoglobin A1c  -Statin: Initiated on Lipitor 80 mg daily.  -We will need to check lipids, currently pending  -Permissive hypertension  -Neurology consult - Dr. Rohan Khan notified  -Echo is pending to look for possible PFO  -PT/OT/SLP  -Maintain on telemetry. If no arrhythmia noted while hospitalized, patient may need Holter monitor as an outpatient. Accelerated hypertension - noted to have elevated blood pressure on presentation of 193/80 but it did decrease to 167/79. She was recently diagnosed with elevated blood pressure and was started on amlodipine as well as metoprolol. We will allow for permissive hypertension secondary to possible acute stroke as seen on MRI brain.  -Resume Norvasc/metoprolol in the a.m. Continue to closely monitor blood pressure    History of GERD -resume Protonix. Prophylaxis  Subcutaneous heparin    CODE STATUS  Full    Primary care physician  Jerel Richards MD    60 minutes spent on this admission - examining patient, obtaining history, reviewing previous medical records, going over test results/imaging and discussing plan of care. All questions answered.      Disposition  Clinical course will dictate outcome      Juanjo Steward MD  9/18/2023  12:37 AM

## 2023-09-18 NOTE — PHYSICAL THERAPY NOTE
PHYSICAL THERAPY EVALUATION - INPATIENT     Room Number: 938/734-Y  Evaluation Date: 9/18/2023  Type of Evaluation: Initial   Physician Order: PT Eval and Treat    Presenting Problem: paresthesia  Reason for Therapy: Mobility Dysfunction and Discharge Planning    PHYSICAL THERAPY ASSESSMENT   Orders received and chart reviewed. SHANIQUA Sullivan approved participation in physical therapy. Session coordinated with OT, gait belt donned. PPE worn by therapist: mask and gloves. Patient was not wearing a mask during session. Patient is a 78year old female admitted 9/17/2023 for Presenting Problem: paresthesia. Patient presented in bed with 0/10 pain. Education provided on Physical therapy plan of care and physiological benefits of out of bed mobility. Patient with good carryover. Patient Vincentian speaking, OT able to translate during the session. Patient on room air. Patient currently with CGA to SBA for mobility during the session with no assistive device. Able to navigate 2 stairs with one handrail with CGA, alternating pattern when ascending and descending. Patient blood pressure 144/70, oxygen sat 96% and heart rate 66. Patient with R knee issues, strength and coordination intact in lower extremities. Patient is currently functioning below baseline with bed mobility, transfers, gait, and stair negotiation as a result of the following impairments: decreased functional strength and medical status. Next session anticipate to progress bed mobility, transfers, gait, and stair negotiation. Patient history and/or personal factors that may impact the plan of care include has history of recent hospitalization. Based on the physical therapy examination of the noted systems and functional activity/participation limitations, the patient presentation is stable given the patient presents with symptoms that are unchanging/predictable.  Patient would benefit from continued skilled physical therapy interventions to maximize patient safety and functional independence. Updated nurse on results of session, patient left in bedside chair with alarm set and family present, all lines intact, all needs met with call light in reach. Bed mobility: Independent  Transfers: Independent  Gait Assistance: Contact guard assist;Supervision  Distance (ft): 400ft  Assistive Device: None  Pattern:  (occasional right sided veering)          Patient was left in bedside chair and alarm activated at end of session with all needs in reach. Patient's current functional deficits include gait and stair navigation. Patient does have the physical skills to return to prior living environment upon D/C from the hospital. Anticipate patient will benefit from continued skilled physical therapy while in the hospital and upon D/C  from the hospital with outpatient physical therapy as needed. The patient's Approx Degree of Impairment: 41.77% has been calculated based on documentation in the [facility] '6 clicks' Inpatient Basic Mobility Short Form. Research supports that patients with this level of impairment may benefit from Home with home health PT. RN aware of patient status post session. Patient will benefit from continued IP PT services to address these deficits in preparation for discharge. DISCHARGE RECOMMENDATIONS  PT Discharge Recommendations: 24 hour care/supervision;Home;Outpatient PT (initial 24 hour supervision when home)    PLAN  PT Treatment Plan: Bed mobility; Body mechanics; Patient education;Gait training;Transfer training;Balance training  Rehab Potential : Good  Frequency (Obs): 3-5x/week       PHYSICAL THERAPY MEDICAL/SOCIAL HISTORY   Problem List  Principal Problem:    Paresthesia  Active Problems:    Hypertension, unspecified type    Past Medical History  Past Medical History:   Diagnosis Date    Anesthesia complication     Arthritis     Cataract     Esophageal reflux     PONV (postoperative nausea and vomiting)      Past Surgical History  Past Surgical History:   Procedure Laterality Date    ARTHROSCOPY OF JOINT UNLISTED Left     knee    CATARACT      COLONOSCOPY N/A 2021    Procedure: COLONOSCOPY;  Surgeon: Radha Cuenca MD;  Location: [facility] ENDO    KNEE ARTHROSCOPY Left 2002    right knee arthroscopy     HOME SITUATION  Type of Home: House   Home Layout: Two level; Able to live on main level  Stairs to Enter : 2  Railing: Yes  Stairs to Bedroom: 0  Railing: No  Lives With: Son  Drives: No  Patient Owned Equipment: None  Patient Regularly Uses: None    Prior Level of [place]: Patient reports being independent in ADL's and ambulation with no assistive device prior to admission to the hospital.     SUBJECTIVE  \"I have pain in my left arm\"    PHYSICAL THERAPY EXAMINATION     OBJECTIVE  Precautions: Aspiration; Seizure;Bed/chair alarm;  needed (Telugu-speaking)  Fall Risk: Standard fall risk    WEIGHT BEARING RESTRICTION  Weight Bearing Restriction: None                PAIN ASSESSMENT  Ratin          COGNITION  Overall Cognitive Status:  WFL - within functional limits    RANGE OF MOTION AND STRENGTH ASSESSMENT    Lower extremity ROM is within functional limits  BLE WNL    Lower extremity strength is within functional limits  BLE WNL    BALANCE  Static Sitting: Good  Dynamic Sitting: Good  Static Standing: Good  Dynamic Standing: Fair +    AM-PAC '6-Clicks' INPATIENT SHORT FORM - BASIC MOBILITY  How much difficulty does the patient currently have. .. Patient Difficulty: Turning over in bed (including adjusting bedclothes, sheets and blankets)?: None   Patient Difficulty: Sitting down on and standing up from a chair with arms (e.g., wheelchair, bedside commode, etc.): A Little   Patient Difficulty: Moving from lying on back to sitting on the side of the bed?: A Little   How much help from another person does the patient currently need. ..    Help from Another: Moving to and from a bed to a chair (including a wheelchair)?: A Little   Help from Another: Need to walk in hospital room?: A Little   Help from Another: Climbing 3-5 steps with a railing?: A Little     AM-PAC Score:  Raw Score: 19   Approx Degree of Impairment: 41.77%   Standardized Score (AM-PAC Scale): 45.44   CMS Modifier (G-Code): CK    Exercise/Education Provided:  Bed mobility  Body mechanics  Gait training  Transfer training    Patient End of Session: Up in chair;Needs met;Call light within reach;RN aware of session/findings; All patient questions and concerns addressed; Family present; Alarm set    CURRENT GOALS  Goals to be met by: 9/30/23  Patient Goal Patient's self-stated goal is: to go home   Goal #1 Patient is able to demonstrate supine - sit EOB @ level: independent     Goal #1   Current Status    Goal #2 Patient is able to demonstrate transfers Sit to/from Stand at assistance level: independent with none     Goal #2  Current Status    Goal #3 Patient is able to ambulate 200 feet with assist device: none at assistance level: independent   Goal #3   Current Status    Goal #4 Patient will negotiate 2 stairs/one curb w/ assistive device and supervision   Goal #4   Current Status    Goal #5 Patient to demonstrate independence with home activity/exercise instructions provided to patient in preparation for discharge.    Goal #5   Current Status    Goal #6    Goal #6  Current Status     Patient Evaluation Complexity Level:  History Low - no personal factors and/or co-morbidities   Examination of body systems Low - addressing 1-2 elements   Clinical Presentation Low - Stable   Clinical Decision Making Low Complexity     Gait Training: 10 minutes  Therapeutic Activity: 13 minutes

## 2023-09-18 NOTE — PLAN OF CARE
Neuro protocol Q4 h. Family at bedside. Waiting for neurology.  used.      Problem: Patient Centered Care  Goal: Patient preferences are identified and integrated in the patient's plan of care  Description: Interventions:  - What would you like us to know as we care for you?   - Provide timely, complete, and accurate information to patient/family  - Incorporate patient and family knowledge, values, beliefs, and cultural backgrounds into the planning and delivery of care  - Encourage patient/family to participate in care and decision-making at the level they choose  - Honor patient and family perspectives and choices  Outcome: Progressing     Problem: Patient/Family Goals  Goal: Patient/Family Long Term Goal  Description: Patient's Long Term Goal: Go home    Interventions:  - VS  - Neuro assessments  -NIH   - Labs  - CT  -MRI  - See additional Care Plan goals for specific interventions  Outcome: Progressing  Goal: Patient/Family Short Term Goal  Description: Patient's Short Term Goal: Feel better and control my blood pressure    Interventions:   - VS  - Follow care plan   - See additional Care Plan goals for specific interventions  Outcome: Progressing     Problem: PAIN - ADULT  Goal: Verbalizes/displays adequate comfort level or patient's stated pain goal  Description: INTERVENTIONS:  - Encourage pt to monitor pain and request assistance  - Assess pain using appropriate pain scale  - Administer analgesics based on type and severity of pain and evaluate response  - Implement non-pharmacological measures as appropriate and evaluate response  - Consider cultural and social influences on pain and pain management  - Manage/alleviate anxiety  - Utilize distraction and/or relaxation techniques  - Monitor for opioid side effects  - Notify MD/LIP if interventions unsuccessful or patient reports new pain  - Anticipate increased pain with activity and pre-medicate as appropriate  Outcome: Progressing Problem: NEUROLOGICAL - ADULT  Goal: Achieves stable or improved neurological status  Description: INTERVENTIONS  - Assess for and report changes in neurological status  - Initiate measures to prevent increased intracranial pressure  - Maintain blood pressure and fluid volume within ordered parameters to optimize cerebral perfusion and minimize risk of hemorrhage  - Monitor temperature, glucose, and sodium.  Initiate appropriate interventions as ordered  Outcome: Progressing  Goal: Absence of seizures  Description: INTERVENTIONS  - Monitor for seizure activity  - Administer anti-seizure medications as ordered  - Monitor neurological status  Outcome: Progressing  Goal: Remains free of injury related to seizure activity  Description: INTERVENTIONS:  - Maintain airway, patient safety  and administer oxygen as ordered  - Monitor patient for seizure activity, document and report duration and description of seizure to MD/LIP  - If seizure occurs, turn patient to side and suction secretions as needed  - Reorient patient post seizure  - Seizure pads on all 4 side rails  - Instruct patient/family to notify RN of any seizure activity  - Instruct patient/family to call for assistance with activity based on assessment  Outcome: Progressing  Goal: Achieves maximal functionality and self care  Description: INTERVENTIONS  - Monitor swallowing and airway patency with patient fatigue and changes in neurological status  - Encourage and assist patient to increase activity and self care with guidance from PT/OT  - Encourage visually impaired, hearing impaired and aphasic patients to use assistive/communication devices  Outcome: Progressing

## 2023-09-18 NOTE — CM/SW NOTE
Patient failed inpatient criteria. Second level of review completed and supports observation. UR committee in agreement. Discussed with Dr. Regine Thorpe  who approves observation status. MOON  notice explained and  provided  to the patient . Copy placed in chart  Order for observation in place.     Afua AKERS, Utilization Review   Ext [phone]

## 2023-09-18 NOTE — SLP NOTE
ADULT SWALLOWING EVALUATION    ASSESSMENT    ASSESSMENT/OVERALL IMPRESSION:  PPE REQUIRED. THIS SLP WORE GLOVES AND DROPLET MASK. HANDS SANITIZED/WASHED UPON ENTRANCE/EXIT. SLP BSSE orders received per stroke protocol and acknowledged. A swallow evaluation warranted as pt presents with new onset of left sided tingling sensation in her hand/arm. The pt was admitted with diagnosis of paresthesia. Chart reviewed and collaborated with RN, Melissa Garcia. Swallowing evaluation approved and RN reports pt remains NPO for BSSE. Pt seen at bedside with family present. Pt agreeable to participate in BSSE. Language line used for Moroccan translation [id number]. Pt afebrile with clear vocal quality, tolerating room air with oxygen saturation at 98% prior to the start of po trials. Pt with no hx of dysphagia at [facility]. The pt is able to participate in conversational speech with motor speech to 100% accuracy and no word finding difficulties. Pt positioned upright to 90 degrees in bedside chair. Oral [facility] examination completed. Face symmetrical at rest with functional ROM/strength with labial, lingual, and buccal movements. The pt denies any tingling/numbness on left or right side of face. The pt with natural dentition on top and edentulous on lower jaw. Per pt report she does have lower dentures at home but does not always wear them when eating soft foods. Assessed the pt with po trials of puree, soft/hard solids, and thin liquids via open cup/straw. Pt with functional oral acceptance and bilabial seal across all trials. Oral phase of swallow appears timely with adequate bolus control and propulsion on puree and liquids. Pt with intact bite, but mastication of solids is prolonged with minimal oral residue post the swallow. Minimal oral stasis was cleared with a liquid wash. Pharyngeal phase of the swallow appears timely with adequate hyolaryngeal elevation/excursion.   Oxygen status remained >97% throughout the entire evaluation. No overt clinical signs of aspiration observed with any consistency (no coughing, no throat clearing, and vocal quality remains dry). Pt denies any globus sensation post the swallow. The pt was left sitting in the chair with family present and call light in reach. At this time, pt presents with minmal oral dysphagia and no overt clinical signs of aspiration during the pharyngeal phase. Recommend a soft easy to chew diet and thin liquids/may use straw with strict adherence to safe swallowing compensatory strategies. Results and recommendations reviewed with RN. SLP collaborated with RN for diet orders. Diet recommendations and swallowing precautions/strategies posted on white board at bedside. FCM SCORE: 6/7       PLAN: Will follow up 1-2x  to ensure safety with diet and educate pt on compensatory strategies/swallow precautions. Video swallow study to be completed if CXR declines, increase in clinical signs of aspiration, and/or MD desires. SLE completed as needed pending MRI results. RECOMMENDATIONS   Diet Recommendations - Solids: Soft/ Easy to chew  Diet Recommendations - Liquids: Thin Liquids                        Compensatory Strategies Recommended: Slow rate; Alternate consistencies;Small bites and sips  Aspiration Precautions: Upright position; Slow rate;Small bites and sips  Medication Administration Recommendations: One pill at a time     Discharge Recommendations/Plan: Undetermined    HISTORY   MEDICAL HISTORY  Reason for Referral: Stroke protocol;R/O aspiration    Problem List  Principal Problem:    Paresthesia  Active Problems:    Hypertension, unspecified type      Past Medical History  Past Medical History:   Diagnosis Date    Anesthesia complication     Arthritis     Cataract     Esophageal reflux     PONV (postoperative nausea and vomiting)        Prior Living Situation: Home with support  Diet Prior to Admission: Regular; Thin liquids  Precautions: Aspiration    Patient/Family Goals: To eat    SWALLOWING HISTORY  Current Diet Consistency: NPO  Dysphagia History: No history of dysphagia  Imaging Results:   CXR 9/17/23:  CONCLUSION: No acute cardiopulmonary abnormality. [facility] provided a preliminary report for this examination. This final report agrees with their preliminary findings. Dictated by (CST): Dennis Main MD on 9/18/2023 at 7:50 AM       Finalized by (CST): Dennis Main MD on 9/18/2023 at 7:50 AM     SUBJECTIVE   Pt is alert and oriented x3. OBJECTIVE   ORAL MOTOR EXAMINATION  Dentition: Lower partials (Upper natural, lower dentures not present at hospital)  Symmetry: Within Functional Limits  Strength: Within Functional Limits  Tone: Within Functional Limits  Range of Motion: Within Functional Limits  Rate of Motion: Within Functional Limits    Voice Quality: Clear  Respiratory Status: Unlabored  Consistencies Trialed: Thin liquids;Puree; Soft solid;Hard solid  Method of Presentation: Self presentation;Spoon;Cup;Straw;Single sips; Consecutive swallows  Patient Positioning: Upright;Midline    Oral Phase of Swallow: Impaired  Bolus Retrieval: Intact  Bilabial Seal: Intact  Bolus Formation: Intact  Bolus Propulsion: Intact  Mastication: Impaired  Retention: Impaired    Pharyngeal Phase of Swallow: Within Functional Limits           (Please note: Silent aspiration cannot be evaluated clinically. Videofluoroscopic Swallow Study is required to rule-out silent aspiration.)    Esophageal Phase of Swallow: No complaints consistent with possible esophageal involvement                GOALS  Goal #1 The patient will tolerate soft easy to chew consistency and thin liquids without overt signs or symptoms of aspiration with 100 % accuracy over 2 session(s). In Progress   Goal #2 The patient/family/caregiver will demonstrate understanding and implementation of aspiration precautions and swallow strategies independently over 2 session(s).     In Progress   Goal #3 Speech language evaluation as needed pending MRI imaging. In Progress     FOLLOW UP     Number of Visits to Meet Established Goals: 2  Follow Up Needed (Documentation Required): Yes  SLP Follow-up Date: 09/19/23    Thank you for your referral.   If you have any questions, please contact     Chelsey Levi MS/CCC-SLP  Speech Language Pathologist  [facility]  EXT.  [phone]

## 2023-09-18 NOTE — ED PROVIDER NOTES
Patient Seen in: [facility] Emergency Department      History   Patient presents with:  Hypertension    Stated Complaint: Hypertension, Tingling on left side    Subjective:   HPI    70-year-old female with history of esophageal reflux, arthritis, and recently diagnosed hypertension presents with complaints of elevated blood pressure along with numbness and tingling to the left side of her chest, left upper extremity, and left lower extremity. She reports onset of tingling within the past 2 hours. She denies focal weakness. She denies any vision or speech changes. The patient was recently started on methylprednisolone for left shoulder pain. She was also recently started on amlodipine for hypertension and was taken off the methylprednisolone at that time. She was seen again in this emergency department overnight last night for elevated blood pressure and was given metoprolol to take as well. Objective:   Past Medical History:   Diagnosis Date    Anesthesia complication     Arthritis     Cataract     Esophageal reflux     PONV (postoperative nausea and vomiting)               Past Surgical History:   Procedure Laterality Date    ARTHROSCOPY OF JOINT UNLISTED Left     knee    CATARACT      COLONOSCOPY N/A 1/20/2021    Procedure: COLONOSCOPY;  Surgeon: Malia Harp MD;  Location: [facility] ENDO    KNEE ARTHROSCOPY Left 2002    right knee arthroscopy                Social History     Socioeconomic History    Marital status:    Tobacco Use    Smoking status: Never    Smokeless tobacco: Never   Vaping Use    Vaping Use: Never used   Substance and Sexual Activity    Alcohol use: No    Drug use: No              Review of Systems    Positive for stated complaint: Hypertension, Tingling on left side  Other systems are as noted in HPI. Constitutional and vital signs reviewed. All other systems reviewed and negative except as noted above.     Physical Exam     ED Triage Vitals [09/17/23 2130]   BP (!) 193/80   Pulse 88   Resp 15   Temp    Temp src    SpO2 97 %   O2 Device None (Room air)       Current:BP (!) 167/79   Pulse 77   Resp 23   SpO2 95%         Physical Exam      General Appearance: alert, no distress  Eyes: pupils equal and round no pallor or injection  ENT, Mouth: mucous membranes moist  Respiratory: there are no retractions, lungs are clear to auscultation  Cardiovascular: regular rate and rhythm  Gastrointestinal:  abdomen is soft and non tender, no masses, bowel sounds normal  Neurological: Speech normal.  Cranial nerves II through XII intact. No focal motor or sensory deficits to extremities x4. Cerebellum intact to finger-to-nose. Skin: warm and dry, no rashes. Musculoskeletal: neck is supple non tender        Extremities are symmetrical, full range of motion  Psychiatric: patient is oriented X 3, there is no agitation    DIFFERENTIAL DIAGNOSIS: After history and physical exam differential diagnosis was considered for CVA, cardiac etiology, or other        ED Course     Labs Reviewed   BASIC METABOLIC PANEL (8) - Abnormal; Notable for the following components:       Result Value    Glucose 113 (*)     BUN 24 (*)     BUN/CREA Ratio 32.9 (*)     All other components within normal limits   POCT GLUCOSE - Abnormal; Notable for the following components:    POC Glucose  120 (*)     All other components within normal limits   TROPONIN I HIGH SENSITIVITY - Normal   CBC WITH DIFFERENTIAL WITH PLATELET    Narrative: The following orders were created for panel order CBC With Differential With Platelet. Procedure                               Abnormality         Status                     ---------                               -----------         ------                     CBC W/ DIFFERENTIAL[[id number]]                              Final result                 Please view results for these tests on the individual orders.    RAINBOW DRAW LAVENDER   RAINBOW DRAW LIGHT GREEN   RAINBOW DRAW BLUE BARBARA DRAW GOLD   CBC W/ DIFFERENTIAL     EKG    Rate, intervals and axes as noted on EKG Report. Rate: 77  Rhythm: Sinus Rhythm  Axis: Normal  Reading: Normal EKG                     MDM      A cardiac alert was called on the patient prior to my evaluation. On my examination the patient has no focal neurologic deficits. She complains of tingling to the left side including her chest and upper and lower extremities. Head CT was negative for bleed or visible infarct. We will obtain an MRI of the brain. MRI results noted. Patient with small lesion with possible infarct not necessarily correlating with the patient's symptoms. Neurologic exam remains unremarkable. Will admit for neurology evaluation. Aspirin and Plavix given in the ED. Discussed with Dr. Katlin Sears, neurology. Also communicated with Dr. Audrey Mckee, hospitalist, through [facility]. Admission disposition: 9/18/2023 12:04 AM                                        Medical Decision Making      Disposition and Plan     Clinical Impression:  Paresthesia  (primary encounter diagnosis)  Hypertension, unspecified type     Disposition:  Admit  9/18/2023 12:04 am    Follow-up:  No follow-up provider specified. We recommend that you schedule follow up care with a primary care provider within the next three months to obtain basic health screening including reassessment of your blood pressure.       Medications Prescribed:  Current Discharge Medication List                          Hospital Problems       Present on Admission  Date Reviewed: 9/11/2023            ICD-10-CM Noted POA    * (Principal) Paresthesia R20.2 9/17/2023 Unknown

## 2023-09-19 ENCOUNTER — APPOINTMENT (OUTPATIENT)
Dept: MRI IMAGING | Facility: HOSPITAL | Age: 78
End: 2023-09-19
Attending: Other
Payer: MEDICARE

## 2023-09-19 PROCEDURE — 72141 MRI NECK SPINE W/O DYE: CPT | Performed by: OTHER

## 2023-09-19 PROCEDURE — 99233 SBSQ HOSP IP/OBS HIGH 50: CPT | Performed by: OTHER

## 2023-09-19 PROCEDURE — 99233 SBSQ HOSP IP/OBS HIGH 50: CPT | Performed by: HOSPITALIST

## 2023-09-19 NOTE — PROGRESS NOTES
Brief neurology progress note    Reviewed MRI of the cervical spine. Patient would benefit from either physiatry evaluation or pain management evaluation or possibly both. Will discuss with her hospitalist.  Final disposition pending their recommendations. We will evaluate the patient again. Full progress note to follow.     Lucita Danielson DO  Staff Vascular [facility] General Neurology

## 2023-09-19 NOTE — SLP NOTE
SPEECH DAILY NOTE - INPATIENT    ASSESSMENT & PLAN   ASSESSMENT  PPE REQUIRED. THIS THERAPIST WORE GLOVES AND MASK FOR DURATION OF EVALUATION. HANDS WASHED UPON ENTRANCE/EXIT. SLP f/u for ongoing meal assessment per recommendations of soft easy to chew/thin liquids per BSE. RN reports pt tolerates diet and medication well with no overt clinical s/s aspiration. Pt denies any swallowing challenges. Pt positioned upright in bed, alert/cooperative/family present. Pt afebrile, tolerating room air with oxygen status 96% prior to the start of oral trials. SLP reviewed aspiration precautions and safe swallowing compensatory strategies with the patient. Safe swallow guidelines remain written on the white board in purple. Patient and family v/u. Provided no assistance, pt tolerates soft easy to chew consistency and thin liquids via straw with no overt clinical signs/symptoms of aspiration. Oxygen status remained stable t/o the entire session. Current diet remains appropriate. No further swallow services warranted at this time. Please re consult if needed. RN alerted with results and recommendations. MOST RECENT CXR 9/17  CONCLUSION: No acute cardiopulmonary abnormality. Diet Recommendations - Solids: Soft/ Easy to chew  Diet Recommendations - Liquids: Thin Liquids    Compensatory Strategies Recommended: Slow rate; Alternate consistencies;Small bites and sips  Aspiration Precautions: Upright position; Slow rate;Small bites and sips  Medication Administration Recommendations: One pill at a time    Patient Experiencing Pain: No                Discharge Recommendations  Discharge Recommendations/Plan: Undetermined    Treatment Plan  Treatment Plan/Recommendations: No further inpatient SLP service warranted    Interdisciplinary Communication: Plan posted at bedside            GOALS  Goal #1 The patient will tolerate soft easy to chew consistency and thin liquids without overt signs or symptoms of aspiration with 100 % accuracy over 2 session(s). Goal Met 9/19   Goal #2 The patient/family/caregiver will demonstrate understanding and implementation of aspiration precautions and swallow strategies independently over 2 session(s). Goal Met 9/19   Goal #3 Speech language evaluation as needed pending MRI imaging.     No stroke per neuro  Discontinue     FOLLOW UP  Follow Up Needed (Documentation Required): No  SLP Follow-up Date: 09/19/23  Number of Visits to Meet Established Goals: 2    Session: 1    If you have any questions, please contact Mani Yeager, EVELIO Cornejo. Coty Fisher Pathologist  Phone Number [phone]

## 2023-09-19 NOTE — PLAN OF CARE
Problem: Patient Centered Care  Goal: Patient preferences are identified and integrated in the patient's plan of care  Description: Interventions:  - What would you like us to know as we care for you?   - Provide timely, complete, and accurate information to patient/family  - Incorporate patient and family knowledge, values, beliefs, and cultural backgrounds into the planning and delivery of care  - Encourage patient/family to participate in care and decision-making at the level they choose  - Honor patient and family perspectives and choices  Outcome: Progressing     Problem: Patient/Family Goals  Goal: Patient/Family Long Term Goal  Description: Patient's Long Term Goal: Go home    Interventions:  - VS  - Neuro assessments  - Labs  -MRI cervical spine  - See additional Care Plan goals for specific interventions  Outcome: Progressing  Goal: Patient/Family Short Term Goal  Description: Patient's Short Term Goal: Feel better and control my blood pressure    Interventions:   - VS  - Follow care plan   - See additional Care Plan goals for specific interventions  Outcome: Progressing     Problem: PAIN - ADULT  Goal: Verbalizes/displays adequate comfort level or patient's stated pain goal  Description: INTERVENTIONS:  - Encourage pt to monitor pain and request assistance  - Assess pain using appropriate pain scale  - Administer analgesics based on type and severity of pain and evaluate response  - Implement non-pharmacological measures as appropriate and evaluate response  - Consider cultural and social influences on pain and pain management  - Manage/alleviate anxiety  - Utilize distraction and/or relaxation techniques  - Monitor for opioid side effects  - Notify MD/LIP if interventions unsuccessful or patient reports new pain  - Anticipate increased pain with activity and pre-medicate as appropriate  Outcome: Progressing     Problem: NEUROLOGICAL - ADULT  Goal: Achieves stable or improved neurological status  Description: INTERVENTIONS  - Assess for and report changes in neurological status  - Initiate measures to prevent increased intracranial pressure  - Maintain blood pressure and fluid volume within ordered parameters to optimize cerebral perfusion and minimize risk of hemorrhage  - Monitor temperature, glucose, and sodium. Initiate appropriate interventions as ordered  Outcome: Progressing  Goal: Absence of seizures  Description: INTERVENTIONS  - Monitor for seizure activity  - Administer anti-seizure medications as ordered  - Monitor neurological status  Outcome: Progressing  Goal: Remains free of injury related to seizure activity  Description: INTERVENTIONS:  - Maintain airway, patient safety  and administer oxygen as ordered  - Monitor patient for seizure activity, document and report duration and description of seizure to MD/LIP  - If seizure occurs, turn patient to side and suction secretions as needed  - Reorient patient post seizure  - Seizure pads on all 4 side rails  - Instruct patient/family to notify RN of any seizure activity  - Instruct patient/family to call for assistance with activity based on assessment  Outcome: Progressing  Goal: Achieves maximal functionality and self care  Description: INTERVENTIONS  - Monitor swallowing and airway patency with patient fatigue and changes in neurological status  - Encourage and assist patient to increase activity and self care with guidance from PT/OT  - Encourage visually impaired, hearing impaired and aphasic patients to use assistive/communication devices  Outcome: Progressing     Patient is alert and orientated x 4.  used for communications. Neuro checks Q 4 hours while awake.

## 2023-09-20 VITALS
WEIGHT: 139.13 LBS | RESPIRATION RATE: 18 BRPM | BODY MASS INDEX: 29 KG/M2 | SYSTOLIC BLOOD PRESSURE: 149 MMHG | OXYGEN SATURATION: 98 % | TEMPERATURE: 98 F | DIASTOLIC BLOOD PRESSURE: 57 MMHG | HEART RATE: 72 BPM

## 2023-09-20 DIAGNOSIS — M54.12 CERVICAL RADICULOPATHY: Primary | ICD-10-CM

## 2023-09-20 PROBLEM — R20.2 PARESTHESIA: Status: ACTIVE | Noted: 2023-09-20

## 2023-09-20 PROCEDURE — 99232 SBSQ HOSP IP/OBS MODERATE 35: CPT | Performed by: OTHER

## 2023-09-20 PROCEDURE — 99239 HOSP IP/OBS DSCHRG MGMT >30: CPT | Performed by: INTERNAL MEDICINE

## 2023-09-20 RX ORDER — AMLODIPINE BESYLATE 10 MG/1
10 TABLET ORAL DAILY
Status: DISCONTINUED | OUTPATIENT
Start: 2023-09-21 | End: 2023-09-20

## 2023-09-20 RX ORDER — AMLODIPINE BESYLATE 10 MG/1
10 TABLET ORAL DAILY
Qty: 30 TABLET | Refills: 0 | Status: SHIPPED | OUTPATIENT
Start: 2023-09-21 | End: 2023-10-21

## 2023-09-20 RX ORDER — PREGABALIN 25 MG/1
CAPSULE ORAL
Qty: 180 CAPSULE | Refills: 0 | Status: SHIPPED
Start: 2023-09-20 | End: 2023-09-20 | Stop reason: DRUGHIGH

## 2023-09-20 RX ORDER — PREGABALIN 25 MG/1
CAPSULE ORAL
Qty: 180 CAPSULE | Refills: 0 | Status: SHIPPED | OUTPATIENT
Start: 2023-09-20 | End: 2023-09-21

## 2023-09-20 RX ORDER — PREGABALIN 25 MG/1
CAPSULE ORAL
Qty: 180 CAPSULE | Refills: 0 | Status: SHIPPED | OUTPATIENT
Start: 2023-09-20 | End: 2023-09-20

## 2023-09-20 NOTE — PLAN OF CARE
Problem: Patient Centered Care  Goal: Patient preferences are identified and integrated in the patient's plan of care  Description: Interventions:  - What would you like us to know as we care for you?   - Provide timely, complete, and accurate information to patient/family  - Incorporate patient and family knowledge, values, beliefs, and cultural backgrounds into the planning and delivery of care  - Encourage patient/family to participate in care and decision-making at the level they choose  - Honor patient and family perspectives and choices  Outcome: Progressing     Problem: Patient/Family Goals  Goal: Patient/Family Long Term Goal  Description: Patient's Long Term Goal: Go home    Interventions:  - VS  - Neuro assessments  - Labs  -MRI cervical spine  - See additional Care Plan goals for specific interventions  Outcome: Progressing  Goal: Patient/Family Short Term Goal  Description: Patient's Short Term Goal: Feel better and control my blood pressure    Interventions:   - VS  - Follow care plan   - See additional Care Plan goals for specific interventions  Outcome: Progressing     Problem: PAIN - ADULT  Goal: Verbalizes/displays adequate comfort level or patient's stated pain goal  Description: INTERVENTIONS:  - Encourage pt to monitor pain and request assistance  - Assess pain using appropriate pain scale  - Administer analgesics based on type and severity of pain and evaluate response  - Implement non-pharmacological measures as appropriate and evaluate response  - Consider cultural and social influences on pain and pain management  - Manage/alleviate anxiety  - Utilize distraction and/or relaxation techniques  - Monitor for opioid side effects  - Notify MD/LIP if interventions unsuccessful or patient reports new pain  - Anticipate increased pain with activity and pre-medicate as appropriate  Outcome: Progressing     Problem: NEUROLOGICAL - ADULT  Goal: Achieves stable or improved neurological status  Description: INTERVENTIONS  - Assess for and report changes in neurological status  - Initiate measures to prevent increased intracranial pressure  - Maintain blood pressure and fluid volume within ordered parameters to optimize cerebral perfusion and minimize risk of hemorrhage  - Monitor temperature, glucose, and sodium. Initiate appropriate interventions as ordered  Outcome: Progressing  Goal: Absence of seizures  Description: INTERVENTIONS  - Monitor for seizure activity  - Administer anti-seizure medications as ordered  - Monitor neurological status  Outcome: Progressing  Goal: Remains free of injury related to seizure activity  Description: INTERVENTIONS:  - Maintain airway, patient safety  and administer oxygen as ordered  - Monitor patient for seizure activity, document and report duration and description of seizure to MD/LIP  - If seizure occurs, turn patient to side and suction secretions as needed  - Reorient patient post seizure  - Seizure pads on all 4 side rails  - Instruct patient/family to notify RN of any seizure activity  - Instruct patient/family to call for assistance with activity based on assessment  Outcome: Progressing  Goal: Achieves maximal functionality and self care  Description: INTERVENTIONS  - Monitor swallowing and airway patency with patient fatigue and changes in neurological status  - Encourage and assist patient to increase activity and self care with guidance from PT/OT  - Encourage visually impaired, hearing impaired and aphasic patients to use assistive/communication devices  Outcome: Progressing     Patient is alert and orientated x 4. Patient is on RA. Pain management consult added.

## 2023-09-20 NOTE — PLAN OF CARE
Problem: Patient Centered Care  Goal: Patient preferences are identified and integrated in the patient's plan of care  Description: Interventions:  - What would you like us to know as we care for you?   - Provide timely, complete, and accurate information to patient/family  - Incorporate patient and family knowledge, values, beliefs, and cultural backgrounds into the planning and delivery of care  - Encourage patient/family to participate in care and decision-making at the level they choose  - Honor patient and family perspectives and choices  Outcome: Progressing     Problem: Patient/Family Goals  Goal: Patient/Family Long Term Goal  Description: Patient's Long Term Goal: Go home    Interventions:  - VS  - Neuro assessments  - Labs  -MRI cervical spine  - See additional Care Plan goals for specific interventions  Outcome: Progressing  Goal: Patient/Family Short Term Goal  Description: Patient's Short Term Goal: Feel better and control my blood pressure    Interventions:   - VS  - Follow care plan   - See additional Care Plan goals for specific interventions  Outcome: Progressing     Problem: PAIN - ADULT  Goal: Verbalizes/displays adequate comfort level or patient's stated pain goal  Description: INTERVENTIONS:  - Encourage pt to monitor pain and request assistance  - Assess pain using appropriate pain scale  - Administer analgesics based on type and severity of pain and evaluate response  - Implement non-pharmacological measures as appropriate and evaluate response  - Consider cultural and social influences on pain and pain management  - Manage/alleviate anxiety  - Utilize distraction and/or relaxation techniques  - Monitor for opioid side effects  - Notify MD/LIP if interventions unsuccessful or patient reports new pain  - Anticipate increased pain with activity and pre-medicate as appropriate  Outcome: Progressing     Problem: NEUROLOGICAL - ADULT  Goal: Achieves stable or improved neurological status  Description: INTERVENTIONS  - Assess for and report changes in neurological status  - Initiate measures to prevent increased intracranial pressure  - Maintain blood pressure and fluid volume within ordered parameters to optimize cerebral perfusion and minimize risk of hemorrhage  - Monitor temperature, glucose, and sodium.  Initiate appropriate interventions as ordered  Outcome: Progressing  Goal: Absence of seizures  Description: INTERVENTIONS  - Monitor for seizure activity  - Administer anti-seizure medications as ordered  - Monitor neurological status  Outcome: Progressing  Goal: Remains free of injury related to seizure activity  Description: INTERVENTIONS:  - Maintain airway, patient safety  and administer oxygen as ordered  - Monitor patient for seizure activity, document and report duration and description of seizure to MD/LIP  - If seizure occurs, turn patient to side and suction secretions as needed  - Reorient patient post seizure  - Seizure pads on all 4 side rails  - Instruct patient/family to notify RN of any seizure activity  - Instruct patient/family to call for assistance with activity based on assessment  Outcome: Progressing  Goal: Achieves maximal functionality and self care  Description: INTERVENTIONS  - Monitor swallowing and airway patency with patient fatigue and changes in neurological status  - Encourage and assist patient to increase activity and self care with guidance from PT/OT  - Encourage visually impaired, hearing impaired and aphasic patients to use assistive/communication devices  Outcome: Progressing

## 2023-09-20 NOTE — DISCHARGE PLANNING
Patient discharging home with son. Medications and follow up appointments reviewed with patient and patients son with . Patient verbalized understanding. IV and tele removed. Patient is stable for discharge at this time.

## 2023-09-21 NOTE — PROGRESS NOTES
Daughter calling about Rx for Lyrica; Unclear why pharmacy won't approve rx  Daughter says that it needs physician approval.  Unclear what that means. 2 factor authentication was used to send Prescription to pharmacy. Spent 20 minutes on hold the pharmacy twice. paged twice by this patient's daughter at 56 and 6 PM. sent another prescription to the pharmacy. She will need to follow-up with her mother's primary care physician and address these matters in the morning. 1. Cervical radiculopathy  - pregabalin (LYRICA) 25 MG Oral Cap; Take 1 capsule (25 mg total) by mouth daily AND 2 capsules (50 mg total) every evening. Dispense: 180 capsule;  Refill: 0

## 2023-09-25 ENCOUNTER — TELEPHONE (OUTPATIENT)
Dept: PHYSICAL THERAPY | Facility: HOSPITAL | Age: 78
End: 2023-09-25

## 2023-09-26 ENCOUNTER — TELEPHONE (OUTPATIENT)
Dept: NEUROLOGY | Facility: CLINIC | Age: 78
End: 2023-09-26

## 2023-09-26 ENCOUNTER — TELEPHONE (OUTPATIENT)
Dept: PHYSICAL THERAPY | Facility: HOSPITAL | Age: 78
End: 2023-09-26

## 2023-09-26 DIAGNOSIS — M54.12 ACUTE CERVICAL RADICULOPATHY: ICD-10-CM

## 2023-09-26 DIAGNOSIS — M54.12 CERVICAL RADICULOPATHY: Primary | ICD-10-CM

## 2023-09-26 NOTE — TELEPHONE ENCOUNTER
I spoke with PT and was informed the current PT order that's placed is more for ortho. Advised that order be updated to say PT neuro for spine. Order has been updated. Detailed message has been left on Margi's voicemail informing her. Reviewed and electronically signed by:  [facility], [address] [place] [address], [facility]

## 2023-09-26 NOTE — TELEPHONE ENCOUNTER
Patient's Daughter called to say that when she called to get PT, they told her that Dr. Morris Mom had put the wrong code on the Order. She said they told her the code he used was for \"headaches\" and it should be  for a hernia in her neck.   Please let her know when it is done so she can schedule

## 2023-10-25 ENCOUNTER — OFFICE VISIT (OUTPATIENT)
Dept: PHYSICAL THERAPY | Age: 78
End: 2023-10-25
Attending: Other

## 2023-10-25 DIAGNOSIS — M54.12 CERVICAL RADICULOPATHY: Primary | ICD-10-CM

## 2023-10-25 DIAGNOSIS — M54.12 ACUTE CERVICAL RADICULOPATHY: ICD-10-CM

## 2023-10-25 PROCEDURE — 97161 PT EVAL LOW COMPLEX 20 MIN: CPT

## 2023-10-25 PROCEDURE — 97140 MANUAL THERAPY 1/> REGIONS: CPT

## 2023-10-25 NOTE — PROGRESS NOTES
P.T. EVALUATION:   Referring Physician: Dr. Sri Munoz  Diagnosis: Cervical radiculopathy (E46.74)  Acute cervical radiculopathy (M54.12)  Date of Onset: August 2023 Date of Service: 10/25/2023     PATIENT Claudetta Darner is a 70year old y/o female who presents to therapy today with complaints of neck and left arm pain. Pt describes pain level 9/10   History of current condition: Patient reports pain began over one month ago. She reports the pain is located in her neck, posterior head, B shoulders, and left arm to the hand. Pt does have numbness in her left UE. Patient reports the most pain with turning her head. She did have an MRI. Current functional limitations include turning her head, sleeping, reaching  Maribel describes prior level of function independent without functional limitations  Pt goals include pain relief  Past medical history was reviewed with Maribel. Patient has a past medical history of Anesthesia complication, Arthritis, Cataract, Esophageal reflux, and PONV (postoperative nausea and vomiting). Social hx: Pt does not work. She is right handed. Language: [place]; Pt declined utilization of  via language line. Patient's daughter present to translate per patient request.       ASSESSMENT:   Patient presents to PT clinic due to cervical radiculopathy. Patient demos decreased cervical ROM, decreased LUE strength, B shoulder ROM to WNL, altered posture, intermittent altered LUE sensation, and pain. These impairments are functionally limiting pt's ability to turn her head, sleep, and reach. Maribel would benefit from skilled Physical Therapy to address the above impairments to relieve pain.       Precautions:  None     OBJECTIVE:   Observation: pt ambulates into clinic independently    Palpation: tension to R/L upper trap, R/L anterior scalenes, R/L levator scap musculature    Sensation: intermittent LUE altered sensation (numbness)    AROM:   Cervical ROM:  Flx: WNL (pain)  Ext: Min loss (pain)  Rot: R mod loss (pain), L min loss (pain)  Lat flx: R WNL (pain), L min loss (pain)    Shoulder ROM:  Flx: B WFL  Abd: B WFL  ER: B WFL    Strength/MMT:   Shoulder flx: R 5/5, L 4+/5  Shoulder abd: R 5/5, L 4+/5  Shoulder ER: R 5/5, L 5/5  Shoulder IR: R 5/5, L 5/5  Biceps: R 5/5, L 4+/5  Triceps: B 5/5    Posture: forward flexed trunk, forward rounded shoulders, head deviated laterally to right? Gait: pt ambulates into clinic independently    [address] and Response:  Patient education provided on PT eval findings, treatment plan, goals, HEP  Patient received there ex, HEP, cervical distraction with R/L c-rotation, c-lateral flexion, STM to R/L anterior scalenes, cervical paraspinals  Charges: PT Eval, Man 2      Total Timed Treatment: 45 min     Total Treatment Time: 45 min      PT Eval Low Complexity     PLAN OF CARE:    Goals:    1- Pt will be I with maintenance and progression of HEP  2- Pt will demo increase in cervical ROM to WNL to ease ability for pt to turn her head  3- Pt will report abolishment of left arm pain to ease ability for pt to reach  4- Pt will report no interruption of sleep due to pain    Frequency / Duration: Patient will be seen for 2x/week or a total of 8 visits over a 90 day period. Treatment will include: Modalities prn, manual therapy, therapeutic exercises, therapeutic activity, and instructions on a home program.     Education or treatment limitation: None  Rehab Potential:good    Patient was advised of these findings, precautions, and treatment options and has agreed to actively participate in planning and for this course of care. Thank you for your referral. Please co-sign or sign and return this letter via fax as soon as possible to [phone].  If you have any questions, please contact me at Dept: [phone]    Sincerely,  Electronically signed by therapist: Lyssa Verdin PT, DPT    [de-identified] certification required: Yes  I certify the need for these services furnished under this plan of treatment and while under my care.     X___________________________________________________ Date____________________    Certification From: 22/11/2494  To:1/23/2024

## 2023-10-27 ENCOUNTER — OFFICE VISIT (OUTPATIENT)
Dept: PHYSICAL THERAPY | Age: 78
End: 2023-10-27
Attending: Other

## 2023-10-27 PROCEDURE — 97110 THERAPEUTIC EXERCISES: CPT

## 2023-10-27 PROCEDURE — 97140 MANUAL THERAPY 1/> REGIONS: CPT

## 2023-10-27 NOTE — PROGRESS NOTES
Diagnosis:  Cervical radiculopathy (M54.12)  Acute cervical radiculopathy (M54.12)          Next MD visit: none scheduled    Fall Risk: standard         Precautions: n/a          Medication Changes since last visit?: No    Subjective: Patient reports 8/10 neck pain today. She reports no arm symptoms currently. She reports she has not had the arm symptoms in one week. She reports the home exercises went fine. Objective:    Language line utilized for Richy Garza Name: Marquis Andrews  ID#: [id number]     Date: 10/27/2023  Visit #: 2/10 (medicare)   HEP   -   Therapeutic Exercise   - supine B shoulder flexion with wand 10x  - seated c-retraction 10x   - seated R/L c-lateral flexion 5x ea  - standing shoulder rolls fwd/back 10x ea  - standing B scap squeezes 10x  - standing B scap rows with GSC 10x  - standing B shoulder ext with [address] 10x  X 23 minutes   Manual Therapy   - STM R/L upper trap musculature, cervical paraspinals x 8 minutes  - supine cervical distraction with rotation, lateral flexion x 8 minutes   Therapeutic Activity   -   Modalities   - heat pad at start of session x 5 minutes in sitting             Assessment: Patient displaying improved cervical ROM this session.         Plan: continue PT    Charges: Ex 2 Man 1      Total Timed Treatment: 39 min  Total Treatment Time: 44 min

## 2023-10-31 ENCOUNTER — OFFICE VISIT (OUTPATIENT)
Dept: PHYSICAL THERAPY | Age: 78
End: 2023-10-31
Attending: Other

## 2023-10-31 PROCEDURE — 97110 THERAPEUTIC EXERCISES: CPT

## 2023-10-31 PROCEDURE — 97140 MANUAL THERAPY 1/> REGIONS: CPT

## 2023-10-31 NOTE — PROGRESS NOTES
Diagnosis:  Cervical radiculopathy (M54.12)  Acute cervical radiculopathy (M54.12)          Next MD visit: none scheduled    Fall Risk: standard         Precautions: n/a          Medication Changes since last visit?: No    Subjective: Patient reports no neck pain or arm symptoms today. She reports she can turn her head with less difficulty now. Objective:    10/31/2023:   Rot: R min loss, L WNL     Date: 10/31/2023  Visit #: 3/10 (medicare) Date: 10/27/2023  Visit #: 2/10 (medicare)   HEP    -   Therapeutic Exercise   - supine B shoulder flexion with wand 15x  - supine c-retraction 10x  - seated c-retraction 10x   - standing B scap rows with GSC 15x  - standing B shoulder ext with RSC 15x  X 15 minutes - supine B shoulder flexion with wand 10x  - seated c-retraction 10x   - seated R/L c-lateral flexion 5x ea  - standing shoulder rolls fwd/back 10x ea  - standing B scap squeezes 10x  - standing B scap rows with GSC 10x  - standing B shoulder ext with V[address] 41 10x  X 23 minutes   Manual Therapy   - STM R/L upper trap musculature, cervical paraspinals in sitting x 8 minutes  - supine cervical distraction with rotation, lateral flexion x 15 minutes  X 23 minutes - STM R/L upper trap musculature, cervical paraspinals x 8 minutes  - supine cervical distraction with rotation, lateral flexion x 8 minutes   Therapeutic Activity    -   Modalities   - heat pad at start of session x 5 minutes in sitting - heat pad at start of session x 5 minutes in sitting              Assessment: Patient displaying improved head and neck posture this session along with cervical rotation motion.       Plan: continue PT    Charges: Ex 1 Man 2    Total Timed Treatment: 38 min  Total Treatment Time: 43 min

## 2023-11-03 ENCOUNTER — OFFICE VISIT (OUTPATIENT)
Dept: PHYSICAL THERAPY | Age: 78
End: 2023-11-03
Attending: Other
Payer: MEDICARE

## 2023-11-03 PROCEDURE — 97110 THERAPEUTIC EXERCISES: CPT

## 2023-11-03 PROCEDURE — 97140 MANUAL THERAPY 1/> REGIONS: CPT

## 2023-11-03 NOTE — PROGRESS NOTES
Diagnosis:  Cervical radiculopathy (M54.12)  Acute cervical radiculopathy (M54.12)          Next MD visit: none scheduled    Fall Risk: standard         Precautions: n/a          Medication Changes since last visit?: No    Subjective: Patient reports no arm symptoms today. She reports slight neck and posterior occiput pain currently - did not rate.       Objective:    10/31/2023:   Rot: R min loss, L WNL     Date: 11/3/2023  Visit #: 4/10 (medicare) Date: 10/31/2023  Visit #: 3/10 (medicare) Date: 10/27/2023  Visit #: 2/10 (medicare)   HEP     -   Therapeutic Exercise   - supine chest press 2# 2 x 10  - supine c-retraction 1 x 10  - supine B shoulder flexion with wand 2 x 10  - seated R/L c-lateral flexion 5x ea  - seated c-retraction 10x   - standing B scap rows with GSC 20x  - standing B shoulder ext with RSC 20x  X 17 minutes - supine B shoulder flexion with wand 15x  - supine c-retraction 10x  - seated c-retraction 10x   - standing B scap rows with GSC 15x  - standing B shoulder ext with RSC 15x  X 15 minutes - supine B shoulder flexion with wand 10x  - seated c-retraction 10x   - seated R/L c-lateral flexion 5x ea  - standing shoulder rolls fwd/back 10x ea  - standing B scap squeezes 10x  - standing B scap rows with GSC 10x  - standing B shoulder ext with Vabad[address] 41 10x  X 23 minutes   Manual Therapy   - STM R/L upper trap musculature, cervical paraspinals in sitting x 6 minutes  - supine cervical distraction with rotation, lateral flexion x 17 minutes  X 23 minutes - STM R/L upper trap musculature, cervical paraspinals in sitting x 8 minutes  - supine cervical distraction with rotation, lateral flexion x 15 minutes  X 23 minutes - STM R/L upper trap musculature, cervical paraspinals x 8 minutes  - supine cervical distraction with rotation, lateral flexion x 8 minutes   Therapeutic Activity     -   Modalities   - heat pad at start of session x 8 minutes in sitting - heat pad at start of session x 5 minutes in sitting - heat pad at start of session x 5 minutes in sitting               Assessment:  Patient continues to display improved posture and cervical motion. Advanced UE strengthening exercises.       Plan: continue PT    Charges: Ex 1 Man 2    Total Timed Treatment: 40 min  Total Treatment Time: 48 min

## 2023-11-06 ENCOUNTER — OFFICE VISIT (OUTPATIENT)
Dept: PHYSICAL THERAPY | Age: 78
End: 2023-11-06
Attending: Other
Payer: MEDICARE

## 2023-11-06 PROCEDURE — 97140 MANUAL THERAPY 1/> REGIONS: CPT

## 2023-11-06 PROCEDURE — 97110 THERAPEUTIC EXERCISES: CPT

## 2023-11-06 NOTE — PROGRESS NOTES
Diagnosis:  Cervical radiculopathy (M54.12)  Acute cervical radiculopathy (M54.12)          Next MD visit: none scheduled    Fall Risk: standard         Precautions: n/a          Medication Changes since last visit?: No    Subjective:  Patient reports no arm symptoms. She reports only sight neck pain near occiput with turning her head. She reports extending her neck is difficult.      Objective:    10/31/2023:   Rot: R min loss, L WNL     Date: 11/6/2023  Visit #: 5/10 (medicare) Date: 11/3/2023  Visit #: 4/10 (medicare) Date: 10/31/2023  Visit #: 3/10 (medicare)   HEP        Therapeutic Exercise   - supine chest press 2# 2 x 10  - supine c-retraction 2 x 10  - supine B shoulder flexion with wand 2 x 10  - supine B shoulder horizontal abd/add with 1# 2 x 10  - seated B bicep curls 2# 2 x 10  - seated R/L c-lateral flexion 5x ea  - seated c-retraction 10x   - standing B scap rows with GSC 20x  - standing B shoulder ext with RSC 20x  - NuStep level 3 (UEs and LEs) x 6 minutes  25 minutes - supine chest press 2# 2 x 10  - supine c-retraction 1 x 10  - supine B shoulder flexion with wand 2 x 10  - seated R/L c-lateral flexion 5x ea  - seated c-retraction 10x   - standing B scap rows with GSC 20x  - standing B shoulder ext with RSC 20x  X 17 minutes - supine B shoulder flexion with wand 15x  - supine c-retraction 10x  - seated c-retraction 10x   - standing B scap rows with GSC 15x  - standing B shoulder ext with RSC 15x  X 15 minutes   Manual Therapy   - supine cervical distraction with rotation, lateral flexion   15 minutes   - STM R/L upper trap musculature, cervical paraspinals in sitting x 6 minutes  - supine cervical distraction with rotation, lateral flexion x 17 minutes  X 23 minutes - STM R/L upper trap musculature, cervical paraspinals in sitting x 8 minutes  - supine cervical distraction with rotation, lateral flexion x 15 minutes  X 23 minutes   Therapeutic Activity        Modalities   - heat pad at start of session x 5 minutes in sitting - heat pad at start of session x 8 minutes in sitting - heat pad at start of session x 5 minutes in sitting               Assessment:  Advanced UE strengthening interventions and initiated NuStep machine to promote UE endurance and strength.        Plan: continue PT    Charges: Ex 2 Man 1    Total Timed Treatment: 40 min  Total Treatment Time: 45 min

## 2023-11-10 ENCOUNTER — HOSPITAL ENCOUNTER (OUTPATIENT)
Dept: GENERAL RADIOLOGY | Facility: HOSPITAL | Age: 78
Discharge: HOME | End: 2023-11-10
Attending: FAMILY MEDICINE
Payer: MEDICARE

## 2023-11-10 DIAGNOSIS — M25.531 RIGHT WRIST PAIN: ICD-10-CM

## 2023-11-10 DIAGNOSIS — M25.521 RIGHT ELBOW PAIN: ICD-10-CM

## 2023-11-10 PROCEDURE — 73080 X-RAY EXAM OF ELBOW: CPT | Performed by: FAMILY MEDICINE

## 2023-11-10 PROCEDURE — 73110 X-RAY EXAM OF WRIST: CPT | Performed by: FAMILY MEDICINE

## 2023-11-13 ENCOUNTER — TELEPHONE (OUTPATIENT)
Dept: ORTHOPEDICS CLINIC | Facility: CLINIC | Age: 78
End: 2023-11-13

## 2023-11-13 NOTE — TELEPHONE ENCOUNTER
Called pt daughter and she states she is not sure how pt fell but thinks she fell on 11/10, but pcp note states on 11/4. She went to see pcp and had XR and was told elbow fx. Offered appt on 11/15 @ 10am with Dr Fernando Madrigal. Address given.

## 2023-11-15 ENCOUNTER — OFFICE VISIT (OUTPATIENT)
Dept: ORTHOPEDICS CLINIC | Facility: CLINIC | Age: 78
End: 2023-11-15

## 2023-11-15 DIAGNOSIS — S52.124A CLOSED NONDISPLACED FRACTURE OF HEAD OF RIGHT RADIUS, INITIAL ENCOUNTER: Primary | ICD-10-CM

## 2023-11-15 PROCEDURE — 99203 OFFICE O/P NEW LOW 30 MIN: CPT | Performed by: ORTHOPAEDIC SURGERY

## 2023-11-15 PROCEDURE — 24650 CLTX RDL HEAD/NCK FX WO MNPJ: CPT | Performed by: ORTHOPAEDIC SURGERY

## 2023-11-15 PROCEDURE — 1126F AMNT PAIN NOTED NONE PRSNT: CPT | Performed by: ORTHOPAEDIC SURGERY

## 2023-11-15 NOTE — PROGRESS NOTES
NURSING INTAKE COMMENTS:   Chief Complaint   Patient presents with    Consult     Right elbow fracture- Patient fell on 11/9/23. Xrays taken and showed possible fracture. Denies pain right now. Rates pain at 8/10 when she applies pressure on elbow. Patient here for granddaughter to translate. HPI: This 70year old female presents today with complaints of right elbow pain. She fell 11/9/2023. Past Medical History:   Diagnosis Date    Anesthesia complication     Arthritis     Cataract     Esophageal reflux     PONV (postoperative nausea and vomiting)      Past Surgical History:   Procedure Laterality Date    ARTHROSCOPY OF JOINT UNLISTED Left     knee    CATARACT      COLONOSCOPY N/A 1/20/2021    Procedure: COLONOSCOPY;  Surgeon: Gal Fenton MD;  Location: [facility] ENDO    KNEE ARTHROSCOPY Left 2002    right knee arthroscopy     Current Outpatient Medications   Medication Sig Dispense Refill    metoprolol succinate ER 25 MG Oral Tablet 24 Hr Take 1 tablet (25 mg total) by mouth daily. amLODIPine 5 MG Oral Tab Take 1 tablet (5 mg total) by mouth daily. 90 tablet 3    alendronate 70 MG Oral Tab Take 1 tablet (70 mg total) by mouth once a week. 12 tablet 0    omeprazole 20 MG Oral Capsule Delayed Release Take 1 capsule (20 mg total) by mouth daily. 90 capsule 0    pregabalin (LYRICA) 25 MG Oral Cap Take 1 capsule (25 mg total) by mouth daily AND 2 capsules (50 mg total) every evening. 180 capsule 2    acetaminophen-codeine (TYLENOL WITH CODEINE #3) 300-30 MG Oral Tab Take 1 tablet by mouth every 6 (six) hours as needed for Pain.  (Patient not taking: Reported on 11/15/2023) 30 tablet 0     No Known Allergies  Family History   Family history unknown: Yes       Social History     Occupational History    Not on file   Tobacco Use    Smoking status: Never    Smokeless tobacco: Never   Vaping Use    Vaping Use: Never used   Substance and Sexual Activity    Alcohol use: Yes     Comment: socially/ocassionally Drug use: No    Sexual activity: Not on file        Review of Systems:  GENERAL: feels generally well, no significant weight loss or weight gain  SKIN: no ulcerated or worrisome skin lesions  EYES:denies blurred vision or double vision  HEENT: denies new nasal congestion, sinus pain or ST  LUNGS: denies shortness of breath  CARDIOVASCULAR: denies chest pain  GI: no hematemesis, no worsening heartburn, no diarrhea  : no dysuria, no blood in urine, no difficulty urinating, no incontinence  MUSCULOSKELETAL: no other musculoskeletal complaints other than in HPI  NEURO: no numbness or tingling, no weakness or balance disorder  PSYCHE: no depression or anxiety  HEMATOLOGIC: no hx of blood dyscrasia  ENDOCRINE: no thyroid or diabetes issues  ALL/ASTHMA: no new hx of severe allergy or asthma    Physical Examination:    There were no vitals taken for this visit. Constitutional: appears well hydrated, alert and responsive, no acute distress noted  Extremities: 10 to 120 degrees of motion of the right elbow. Mild discomfort with full flexion and full extension. No discomfort with gentle pronation and supination. Imaging: XR WRIST COMPLETE (MIN 3 VIEWS), RIGHT (CPT=73110)    Result Date: 11/10/2023  PROCEDURE: XR WRIST COMPLETE (MIN 3 VIEWS), RIGHT (CPT=73110)  COMPARISON: None. INDICATIONS: Right wrist pain,patient fell yesterday. TECHNIQUE: Three views FINDINGS: Intact distal radial side plate and screws. Old healed radial fracture. No acute fracture. Normal alignment. CONCLUSION: No acute fracture    Dictated by (CST): Yves Mireles MD on 11/10/2023 at 4:00 PM     Finalized by (CST): Yves Mireles MD on 11/10/2023 at 4:01 PM          XR ELBOW, COMPLETE (MIN 3 VIEWS), RIGHT (CPT=73080)    Result Date: 11/10/2023  PROCEDURE: XR ELBOW, COMPLETE (MIN 3 VIEWS), RIGHT (CPT=73080)  COMPARISON: None. INDICATIONS: Right elbow pain,patient fell yesterday.   TECHNIQUE: Four-views FINDINGS:   There is nondisplaced intra-articular fracture of the radial head. Small joint effusion. Normal alignment. Tiny osteophyte along the coronoid process          CONCLUSION: Nondisplaced intra-articular fracture of the radial head with small joint effusion    Dictated by (CST): Nury Paige MD on 11/10/2023 at 3:59 PM     Finalized by (CST): Nury Paige MD on 11/10/2023 at 4:00 PM             Lab Results   Component Value Date    WBC 9.3 09/17/2023    HGB 13.0 09/17/2023    .0 09/17/2023      Lab Results   Component Value Date     (H) 09/17/2023    BUN 24 (H) 09/17/2023    CREATSERUM 0.73 09/17/2023    GFRNAA 77 07/06/2022    GFRAA 90 07/06/2022        Assessment and Plan:  Diagnoses and all orders for this visit:    Closed nondisplaced fracture of head of right radius, initial encounter        Assessment: Nondisplaced right radial head fracture. Plan: Continue range of motion as tolerated. Follow-up in 3 to 4 weeks with new radiographs. Anticipate physical therapy at that time. I told her that she may lose a little bit of extension as a result of the injury. No need for ORIF or radial head displacement given the nondisplaced nature of the fracture. The above note was creating using Dragon speech recognition technology. Please excuse any typos.     Brea Watkins MD

## 2023-11-16 ENCOUNTER — OFFICE VISIT (OUTPATIENT)
Dept: PHYSICAL THERAPY | Age: 78
End: 2023-11-16
Attending: Other
Payer: MEDICARE

## 2023-11-16 PROCEDURE — 97140 MANUAL THERAPY 1/> REGIONS: CPT

## 2023-11-16 PROCEDURE — 97110 THERAPEUTIC EXERCISES: CPT

## 2023-11-16 NOTE — PROGRESS NOTES
Diagnosis:  Cervical radiculopathy (M54.12)  Acute cervical radiculopathy (M54.12)          Next MD visit: none scheduled    Fall Risk: standard         Precautions: n/a          Medication Changes since last visit?: No    Subjective:  Patient reports she fell on Nov 9th and fractured her elbow. Patient saw orthopedic who states she can continue PT for her neck, but not to do any exercises that cause her elbow pain. She reports 9/10 right neck and central neck pain today. She reports her neck pain did increase since her fall.      Objective:    10/31/2023:   Rot: R min loss, L WNL     Date: 11/16/2023  Visit #: 6/10 (medicare) Date: 11/6/2023  Visit #: 5/10 (medicare) Date: 11/3/2023  Visit #: 4/10 (medicare)   HEP        Therapeutic Exercise   - supine c-retraction 2 x 10  - supine R/L c-rotation 10x ea  8 minutes   - supine chest press 2# 2 x 10  - supine c-retraction 2 x 10  - supine B shoulder flexion with wand 2 x 10  - supine B shoulder horizontal abd/add with 1# 2 x 10  - seated B bicep curls 2# 2 x 10  - seated R/L c-lateral flexion 5x ea  - seated c-retraction 10x   - standing B scap rows with GSC 20x  - standing B shoulder ext with RSC 20x  - NuStep level 3 (UEs and LEs) x 6 minutes  25 minutes - supine chest press 2# 2 x 10  - supine c-retraction 1 x 10  - supine B shoulder flexion with wand 2 x 10  - seated R/L c-lateral flexion 5x ea  - seated c-retraction 10x   - standing B scap rows with GSC 20x  - standing B shoulder ext with RSC 20x  X 17 minutes   Manual Therapy   - supine cervical distraction with rotation, lateral flexion   - STM R/L cervical paraspinals, upper traps, anterior scalenes  28 minutes - supine cervical distraction with rotation, lateral flexion   15 minutes   - STM R/L upper trap musculature, cervical paraspinals in sitting x 6 minutes  - supine cervical distraction with rotation, lateral flexion x 17 minutes  X 23 minutes   Therapeutic Activity        Modalities   - heat pad at start of session x 5 minutes in sitting - heat pad at start of session x 5 minutes in sitting - heat pad at start of session x 8 minutes in sitting               Assessment:  Session modified to avoid UE movements/exercises this session. Focused on continuation of cervical ROM session, manual techniques.       Plan: continue PT    Charges: Ex 1 Man 2     Total Timed Treatment: 36 min  Total Treatment Time: 41 min

## 2023-11-17 ENCOUNTER — OFFICE VISIT (OUTPATIENT)
Dept: PHYSICAL THERAPY | Age: 78
End: 2023-11-17
Attending: Other
Payer: MEDICARE

## 2023-11-17 PROCEDURE — 97110 THERAPEUTIC EXERCISES: CPT

## 2023-11-17 PROCEDURE — 97140 MANUAL THERAPY 1/> REGIONS: CPT

## 2023-11-17 NOTE — PROGRESS NOTES
Diagnosis:  Cervical radiculopathy (M54.12)  Acute cervical radiculopathy (M54.12)          Next MD visit: none scheduled    Fall Risk: standard         Precautions: n/a          Medication Changes since last visit?: No    Subjective:  Patient reports 8/10 neck pain today.      Objective:    10/31/2023:   Rot: R min loss, L WNL    11/17/2023:  Notable tension/knots/trigger points R SCM, anterior scalenes     Date: 11/17/2023  Visit #: 7/10 (medicare) Date: 11/16/2023  Visit #: 6/10 (medicare) Date: 11/6/2023  Visit #: 5/10 (medicare)   HEP   - updated HEP - see pt instructions section     Therapeutic Exercise   - supine c-retraction 2 x 10  - supine R/L c-rotation 10x ea  - seated scap retraction 10x  10 minutes - supine c-retraction 2 x 10  - supine R/L c-rotation 10x ea  8 minutes   - supine chest press 2# 2 x 10  - supine c-retraction 2 x 10  - supine B shoulder flexion with wand 2 x 10  - supine B shoulder horizontal abd/add with 1# 2 x 10  - seated B bicep curls 2# 2 x 10  - seated R/L c-lateral flexion 5x ea  - seated c-retraction 10x   - standing B scap rows with GSC 20x  - standing B shoulder ext with RSC 20x  - NuStep level 3 (UEs and LEs) x 6 minutes  25 minutes   Manual Therapy   - supine cervical distraction with rotation, lateral flexion   - STM R/L cervical paraspinals, upper traps, anterior scalenes, SCM  28 minutes - supine cervical distraction with rotation, lateral flexion   - STM R/L cervical paraspinals, upper traps, anterior scalenes  28 minutes - supine cervical distraction with rotation, lateral flexion   15 minutes     Therapeutic Activity        Modalities   - heat pad at start of session x 5 minutes in sitting - heat pad at start of session x 5 minutes in sitting - heat pad at start of session x 5 minutes in sitting             Assessment:  Session focused on continuation of manual techniques and review of home program.    Plan: pt to continue with home program when return from out of town    Charges: Ex 1 Man 2     Total Timed Treatment: 38 min  Total Treatment Time: 43 min

## 2023-12-08 ENCOUNTER — OFFICE VISIT (OUTPATIENT)
Dept: PHYSICAL THERAPY | Age: 78
End: 2023-12-08
Attending: Other
Payer: MEDICARE

## 2023-12-08 PROCEDURE — 97110 THERAPEUTIC EXERCISES: CPT

## 2023-12-08 PROCEDURE — 97140 MANUAL THERAPY 1/> REGIONS: CPT

## 2023-12-08 NOTE — PROGRESS NOTES
Physical Therapy Discharge Summary    Diagnosis:  Cervical radiculopathy (M54.12)  Acute cervical radiculopathy (M54.12)          Next MD visit: none scheduled    Fall Risk: standard         Precautions: n/a          Medication Changes since last visit?: No    Subjective:  Patient reports 2-3/10 B upper trap region pain today. She also reports some right elbow region pain from her fall/fracture. Objective:    12/8/2023:  (Start of session):  Flx: WNL  Ext: WNL  Rot: R WNL, L min loss  Lat flx: R WNL, L min loss     Date: 12/8/2023  Visit #: 8/10 (medicare) Date: 11/17/2023  Visit #: 7/10 (medicare) Date: 11/16/2023  Visit #: 6/10 (medicare)   HEP    - updated HEP - see pt instructions section    Therapeutic Exercise   - seated scap retractions over ball 2 x 10  - standing B scap rows with GSC 2 x 10  - standing B shoulder extension with RSC 2 x 10  - supine c-retraction 2 x 10  - supine R/L c-rotation 10x ea  18 minutes - supine c-retraction 2 x 10  - supine R/L c-rotation 10x ea  - seated scap retraction 10x  10 minutes - supine c-retraction 2 x 10  - supine R/L c-rotation 10x ea  8 minutes     Manual Therapy   - supine cervical distraction with rotation, lateral flexion   - STM R/L cervical paraspinals, anterior scalenes, SCM  23 minutes - supine cervical distraction with rotation, lateral flexion   - STM R/L cervical paraspinals, upper traps, anterior scalenes, SCM  28 minutes - supine cervical distraction with rotation, lateral flexion   - STM R/L cervical paraspinals, upper traps, anterior scalenes  28 minutes   Therapeutic Activity        Modalities    - heat pad at start of session x 5 minutes in sitting - heat pad at start of session x 5 minutes in sitting             Assessment:  Patient demos improved overall posture, improved cervical ROM, and has reduced pain. She is independent with her home program.  Patient to transition to home program and in agreement with plan.      Plan: transition to home program; discharge PT    Charges: Ex 1 Man 2   Total Timed Treatment: 41 min  Total Treatment Time: 41 min

## 2024-02-02 ENCOUNTER — HOSPITAL ENCOUNTER (OUTPATIENT)
Dept: GENERAL RADIOLOGY | Facility: HOSPITAL | Age: 79
Discharge: HOME | End: 2024-02-02
Attending: FAMILY MEDICINE
Payer: MEDICARE

## 2024-02-02 DIAGNOSIS — R05.1 ACUTE COUGH: ICD-10-CM

## 2024-02-02 PROCEDURE — 71046 X-RAY EXAM CHEST 2 VIEWS: CPT | Performed by: FAMILY MEDICINE

## 2024-07-30 ENCOUNTER — LAB ENCOUNTER (OUTPATIENT)
Dept: LAB | Age: 79
End: 2024-07-30
Attending: FAMILY MEDICINE
Payer: MEDICARE

## 2024-07-30 PROCEDURE — 85025 COMPLETE CBC W/AUTO DIFF WBC: CPT | Performed by: FAMILY MEDICINE

## 2024-07-30 PROCEDURE — 80053 COMPREHEN METABOLIC PANEL: CPT | Performed by: FAMILY MEDICINE

## 2024-07-30 PROCEDURE — 36415 COLL VENOUS BLD VENIPUNCTURE: CPT | Performed by: FAMILY MEDICINE

## 2024-07-30 PROCEDURE — 83036 HEMOGLOBIN GLYCOSYLATED A1C: CPT | Performed by: FAMILY MEDICINE

## 2024-07-30 PROCEDURE — 80061 LIPID PANEL: CPT | Performed by: FAMILY MEDICINE

## 2024-08-23 ENCOUNTER — HOSPITAL ENCOUNTER (OUTPATIENT)
Dept: MAMMOGRAPHY | Facility: HOSPITAL | Age: 79
Discharge: HOME | End: 2024-08-23
Attending: FAMILY MEDICINE
Payer: MEDICARE

## 2024-08-23 DIAGNOSIS — Z12.31 SCREENING MAMMOGRAM, ENCOUNTER FOR: ICD-10-CM

## 2024-08-23 PROCEDURE — 77063 BREAST TOMOSYNTHESIS BI: CPT | Performed by: FAMILY MEDICINE

## 2024-08-23 PROCEDURE — 77067 SCR MAMMO BI INCL CAD: CPT | Performed by: FAMILY MEDICINE

## 2024-08-31 ENCOUNTER — HOSPITAL ENCOUNTER (OUTPATIENT)
Dept: ULTRASOUND IMAGING | Age: 79
Discharge: HOME | End: 2024-08-31
Attending: FAMILY MEDICINE
Payer: MEDICARE

## 2024-08-31 DIAGNOSIS — R10.13 DYSPEPSIA: ICD-10-CM

## 2024-08-31 PROCEDURE — 76700 US EXAM ABDOM COMPLETE: CPT | Performed by: FAMILY MEDICINE

## 2025-01-07 ENCOUNTER — OFFICE VISIT (OUTPATIENT)
Dept: FAMILY MEDICINE CLINIC | Facility: CLINIC | Age: 80
End: 2025-01-07

## 2025-01-07 VITALS
SYSTOLIC BLOOD PRESSURE: 129 MMHG | BODY MASS INDEX: 28 KG/M2 | DIASTOLIC BLOOD PRESSURE: 79 MMHG | HEART RATE: 96 BPM | WEIGHT: 135 LBS

## 2025-01-07 DIAGNOSIS — R10.13 DYSPEPSIA: ICD-10-CM

## 2025-01-07 DIAGNOSIS — Z12.11 COLON CANCER SCREENING: Primary | ICD-10-CM

## 2025-01-07 DIAGNOSIS — I10 ESSENTIAL HYPERTENSION: ICD-10-CM

## 2025-01-07 DIAGNOSIS — Z23 NEEDS FLU SHOT: ICD-10-CM

## 2025-01-07 DIAGNOSIS — M81.0 OSTEOPOROSIS, UNSPECIFIED OSTEOPOROSIS TYPE, UNSPECIFIED PATHOLOGICAL FRACTURE PRESENCE: ICD-10-CM

## 2025-01-07 DIAGNOSIS — K63.5 POLYP OF COLON, UNSPECIFIED PART OF COLON, UNSPECIFIED TYPE: ICD-10-CM

## 2025-01-07 DIAGNOSIS — Z23 NEED FOR SHINGLES VACCINE: ICD-10-CM

## 2025-01-07 DIAGNOSIS — M80.00XD AGE-RELATED OSTEOPOROSIS WITH CURRENT PATHOLOGICAL FRACTURE WITH ROUTINE HEALING, SUBSEQUENT ENCOUNTER: ICD-10-CM

## 2025-01-07 PROBLEM — S52.90XA CLOSED FRACTURE OF RADIUS: Status: RESOLVED | Noted: 2017-02-06 | Resolved: 2025-01-07

## 2025-01-07 PROBLEM — M25.531 RIGHT WRIST PAIN: Status: RESOLVED | Noted: 2017-02-06 | Resolved: 2025-01-07

## 2025-01-07 PROBLEM — R20.2 PARESTHESIA: Status: RESOLVED | Noted: 2023-09-20 | Resolved: 2025-01-07

## 2025-01-07 PROBLEM — M54.12 ACUTE CERVICAL RADICULOPATHY: Status: RESOLVED | Noted: 2023-09-17 | Resolved: 2025-01-07

## 2025-01-07 PROCEDURE — G0008 ADMIN INFLUENZA VIRUS VAC: HCPCS | Performed by: FAMILY MEDICINE

## 2025-01-07 PROCEDURE — 99204 OFFICE O/P NEW MOD 45 MIN: CPT | Performed by: FAMILY MEDICINE

## 2025-01-07 PROCEDURE — 90662 IIV NO PRSV INCREASED AG IM: CPT | Performed by: FAMILY MEDICINE

## 2025-01-07 RX ORDER — NIFEDIPINE 30 MG/1
30 TABLET, EXTENDED RELEASE ORAL DAILY
Qty: 90 TABLET | Refills: 3 | Status: SHIPPED | OUTPATIENT
Start: 2025-01-07

## 2025-01-07 RX ORDER — ZOSTER VACCINE RECOMBINANT, ADJUVANTED 50 MCG/0.5
0.5 KIT INTRAMUSCULAR ONCE
Qty: 1 EACH | Refills: 1 | Status: SHIPPED | OUTPATIENT
Start: 2025-01-14 | End: 2025-01-14

## 2025-01-07 RX ORDER — ALENDRONATE SODIUM 70 MG/1
70 TABLET ORAL WEEKLY
Qty: 12 TABLET | Refills: 3 | Status: SHIPPED | OUTPATIENT
Start: 2025-01-07

## 2025-01-07 NOTE — PROGRESS NOTES
HPI:   Maribel Blanco is a 79 year old female who presents for an establish care visit.  Patient has a history of hypertension, osteoporosis, mild hyperlipidemia and prediabetes.     Blood pressure is well-controlled on nifedipine but she does describe some weakness.  Had to discontinue lisinopril, metoprolol and amlodipine in the past due to side effects.    She was asking about gabapentin.  She was prescribed this medication previously 25 mg in the morning and 50 mg at night.     Wt Readings from Last 3 Encounters:   01/07/25 135 lb (61.2 kg)   07/29/24 139 lb (63 kg)   02/16/24 134 lb (60.8 kg)     Body mass index is 28.22 kg/m².       Current Outpatient Medications   Medication Sig Dispense Refill    alendronate 70 MG Oral Tab Take 1 tablet (70 mg total) by mouth once a week. 12 tablet 3    omeprazole 20 MG Oral Capsule Delayed Release Take 1 capsule (20 mg total) by mouth daily. 90 capsule 3    NIFEdipine ER 30 MG Oral Tablet 24 Hr Take 1 tablet (30 mg total) by mouth daily. 90 tablet 3    [START ON 1/14/2025] Zoster Vac Recomb Adjuvanted (SHINGRIX) 50 MCG/0.5ML Intramuscular Recon Susp Inject 0.5 mL into the muscle one time for 1 dose. 1 each 1      Past Medical History:    Anesthesia complication    Arthritis    Cataract    Esophageal reflux    PONV (postoperative nausea and vomiting)      Past Surgical History:   Procedure Laterality Date    Arthroscopy of joint unlisted Left     knee    Cataract      Colonoscopy N/A 1/20/2021    Procedure: COLONOSCOPY;  Surgeon: Qasim Montesinos MD;  Location: [facility] ENDO    Knee arthroscopy Left 2002    right knee arthroscopy      Family History   Family history unknown: Yes      Social History:   Social History     Socioeconomic History    Marital status:    Tobacco Use    Smoking status: Never     Passive exposure: Never    Smokeless tobacco: Never   Vaping Use    Vaping status: Never Used   Substance and Sexual Activity    Alcohol use: Yes     Comment:  socially/ocassionally    Drug use: No          REVIEW OF SYSTEMS:   Negative, except per HPI.     EXAM:   /79 (BP Location: Right arm, Patient Position: Sitting, Cuff Size: adult)   Pulse 96   Wt 135 lb (61.2 kg)   BMI 28.22 kg/m²     GENERAL: well developed, well nourished, in no apparent distress  SKIN: no rashes, no suspicious lesions  HEENT: atraumatic, normocephalic, ears are clear  EYES: PERRLA, EOMI,conjunctiva are clear  NECK: supple, no adenopathy    ASSESSMENT AND PLAN:     1. Colon cancer screening  Last colonoscopy 2021, 3-year recall  - [facility]STR[facility] - INTERNAL    3. Essential hypertension  - Stable condition, continue present management.    - NIFEdipine ER 30 MG Oral Tablet 24 Hr; Take 1 tablet (30 mg total) by mouth daily.  Dispense: 90 tablet; Refill: 3    4. Age-related osteoporosis with current pathological fracture with routine healing, subsequent encounter  - alendronate 70 MG Oral Tab; Take 1 tablet (70 mg total) by mouth once a week.  Dispense: 12 tablet; Refill: 3    5. Dyspepsia  - omeprazole 20 MG Oral Capsule Delayed Release; Take 1 capsule (20 mg total) by mouth daily.  Dispense: 90 capsule; Refill: 3    6. Polyp of colon, unspecified part of colon, unspecified type  - GASTRO - INTERNAL    7. Needs flu shot  - High Dose Fluzone trivalent influenza, 65yrs+ PFS (42748)    8. Need for shingles vaccine  - Zoster Vac Recomb Adjuvanted (SHINGRIX) 50 MCG/0.5ML Intramuscular Recon Susp; Inject 0.5 mL into the muscle one time for 1 dose.  Dispense: 1 each; Refill: 1     RTC if no improvement in symptoms. Red flags discussed to go to ER.     Mary Walter MD  1/7/2025  10:24 AM

## 2025-01-08 ENCOUNTER — TELEPHONE (OUTPATIENT)
Facility: CLINIC | Age: 80
End: 2025-01-08

## 2025-01-08 DIAGNOSIS — Z86.0100 HISTORY OF COLON POLYPS: Primary | ICD-10-CM

## 2025-01-08 NOTE — TELEPHONE ENCOUNTER
Patients daughter states that the patient is due to schedule Recall Colonoscopy and Endoscopy procedures.

## 2025-01-09 RX ORDER — PHENOL 1.4 %
600 AEROSOL, SPRAY (ML) MUCOUS MEMBRANE DAILY
COMMUNITY

## 2025-01-09 NOTE — TELEPHONE ENCOUNTER
Called patient for scheduled telephone colon screen.   Please advise on colonoscopy and bowel prep orders.   Medications, pharmacy, and allergies reviewed.     Age 45-74 y/o:   › MD preference: Dr. Montesinos  › Insurance:  MEDICARE  › Last PCP visit: 1/7/2025 with Dr. Walter  › Last CBC: 7/30/2024  › H/W/BMI: 4'8\"/ 135 lbs/30.3    Special comments/notes:  Recall    Last Procedure, Date, MD:   Dr. Montesinos, Colon/EGD, 1/20/2021   Last diagnosis: See below   Recalled for (mth/yrs): 3 years   Sedation used previously: MAC   Last Prep Used: suprep   Quality of Prep: good     Telephone Colon Screening Questionnaire Yes No   Are you currently experiencing any GI symptoms [] [x]   If yes, explain:     Rectal bleeding [] [x]   Black stool [] [x]   Dysphagia or food \"feeling stuck\" when eating [] [x]   Intractable vomiting [] [x]   Unexplained weight loss [] [x]   First colonoscopy [] [x]   Family history of colon cancer [] [x]   Any issues with anesthesia  nausea [x] []   If yes, explain:      Any recent complaints related to chest pain &/or shortness of breath [] [x]   Referred to a cardiologist?  [] [x]   If yes, explain:      History of  respiratory issues/oxygen/NICOLE/COPD [] [x]   CPAP/BiPAP:     History of devices (pacemaker/defibrillator) [] [x]   History of heart attack &/or stroke [] [x]   If yes, in the last 12 months? Stent placement?  [] [x]     Medication Reconciliation  Yes  No   Anticoagulants (except Aspirin) [] [x]   Diabetic Medication [] [x]   Weight loss medication (phentermine/vyvanse/saxsenda/etc) [] [x]   Iron/herbal/multivitamin supplement(s)  Vitmain C, vitamin D, calcium  [x] []   Usage of marijuana, CBD &/or vape product(s) [] [x]          Qasim Montesinos MD   Physician  Gastroenterology     Operative Report     Signed     Date of Service: 1/20/2021  8:43 AM  Case Time: Procedures: Surgeons:   1/20/2021  8:10 AM COLONOSCOPY   ESOPHAGOGASTRODUODENOSCOPY (EGD)    Qasim Montesinos MD               Signed          ESOPHAGOGASTRODUODENOSCOPY (EGD) & COLONOSCOPY REPORT           Maribel Blanco      10/10/1945 Age 75 year old   PCP LEVON PRINCE MD Endoscopist Qasim Montesinos MD      Date of procedure: 21     Procedure: EGD w/ biopsies & Colonoscopy w/cold snare polypectomy     Pre-operative diagnosis: reflux and surveillance for history of polyps     Post-operative diagnosis: gastric erythema, polyps, diverticulosis and hemorrhoids     Medications: MAC sedation  Withdrawal time: 18 minutes     Complications: none     Procedure: Informed consent was obtained from the patient after the risks of the procedure were discussed, including but not limited to bleeding, perforation, aspiration, infection, or possibility of a missed lesion. We discussed the risks/benefits and alternatives to this procedure, as well as the planned sedation. EGD procedure: The patient was placed in the left lateral decubitus position and begun on continuous blood pressure pulse oximetry and EKG monitoring and this was maintained throughout the procedure. Once an adequate level of sedation was obtained a bite block was placed. Then the lubricated tip of the [id number] diagnostic video upper endoscope was inserted and advanced using direct visualization into the posterior pharynx and ultimately into the esophagus. Colonoscopy procedure: Once an adequate level of sedation was obtained a digital rectal exam was completed. Then the lubricated tip of the [id number] diagnostic video colonoscope was inserted and advanced without difficulty to the cecum using the CO2 insufflation technique. The cecum was identified by localizing the trifold, the appendix and the ileocecal valve. A routine second examination of the cecum/ascending colon was performed. Withdrawal was begun with thorough washing and careful examination of the colonic walls and folds. Photodocumentation was obtained. The bowel prep was good. Views of the colon were good with washing.  I then carefully withdrew the instrument from the patient who tolerated the procedure well.      Complications: None     EGD findings:       1. Esophagus: The squamocolumnar junction was noted at 30 cm and appeared regular. The GE junction was noted at 30 cm from the incisors. No significant hiatal hernia. The esophageal mucosa appeared normal. There was no evidence of esophagitis, stricture or endoscopic evidence of Norman's esophagus.  2. Stomach: The stomach distended normally. Normal rugal folds were seen. The pylorus was patent. The gastric mucosa appeared erythematous so biopsies taken for H pylori. Retroflexion revealed a normal fundus and a normal cardia.   3. Duodenum: The duodenal mucosa appeared normal in the 1st and 2nd portion of the duodenum.      Colonoscopy findings:     1. DAVID: normal rectal tone, no masses palpated.   2. 2 polyp(s) noted as follows:      A. 4 mm polyp in the ascending colon; flat morphology; cold snare polypectomy and retrieved.      B. 4 mm polyp in the sigmoid colon; flat morphology, adenomatous appearing; cold snare polypectomy and retrieved.    3. Terminal ileum: the visualized mucosa appeared normal.  4. The colonic mucosa throughout the colon showed normal vascular pattern, without evidence of angioectasias or inflammation.   5. Diverticulosis: left sided.  6. A retroflexed view of the rectum revealed hemorrhoids.        Impression:  No erosive reflux     Recommend:  Await pathology. The interval for the next colonoscopy will be determined after reviewing pathology. If new signs or symptoms develop, colonoscopy may need to be repeated sooner.   Reflux precautions  High fiber diet.  Monitor for blood in the stool. If having more than just tinge of blood, call office or go to the ER.        >>>If tissue was obtained and you have not received your pathology results either by phone or letter within 2 weeks, please call our office at [phone].     Specimens: gastric biopsies,  ascending polyp and sigmoid polyp                 Final Diagnosis:      A. Stomach; biopsy:  Gastric antral and oxyntic type mucosa with mild to moderate chronic gastritis  Helicobacter pylori immunohistochemical stain is pending negative     B. Ascending colon polyp x1; biopsy:  Tubular adenoma     C. Sigmoid colon polyp x1; biopsy:  Tubular adenoma

## 2025-01-13 ENCOUNTER — TELEPHONE (OUTPATIENT)
Facility: CLINIC | Age: 80
End: 2025-01-13

## 2025-01-13 RX ORDER — SODIUM, POTASSIUM,MAG SULFATES 17.5-3.13G
SOLUTION, RECONSTITUTED, ORAL ORAL
Qty: 708 ML | Refills: 0 | Status: SHIPPED | OUTPATIENT
Start: 2025-01-13

## 2025-01-13 NOTE — TELEPHONE ENCOUNTER
1. Schedule colonoscopy with MAC [Diagnosis: history of polyps]    2.  bowel prep from pharmacy (split suprep)    3. Continue all medications for procedure except    ** If MAC @ [facility]:    - NO alcohol, recreational drugs nor erectile dysfunction mediations 72 hours before procedure(s)   - NO herbal supplements or weight loss medications x 7 days prior to the procedure(s)    ** If MAC @ [facility] or  twilit - continue all medications as prescribed    4. Read all bowel prep instructions carefully    5. AVOID seeds, nuts, popcorn, raw fruits and vegetables (cooked is okay) for 7 days before procedure        >>>Please note: if you were prescribed Suprep for the bowel prep and it is too expensive or not covered by insurance, it is okay to substitute Trilyte (or any similar generic prep). This can be done by notifying the pharmacy or calling our office.

## 2025-01-13 NOTE — TELEPHONE ENCOUNTER
Scheduled for:  Colonoscopy [id number]    Provider Name:   Jaguar    Date:  4/15/2025    Location:    [facility]    Sedation:  MAC    Time:  800 am (Patient made aware [facility] will call the day before with procedure/arrival time       Prep:  Suprep    Meds/Allergies Reconciled?:  Physician reviewed     Diagnosis with codes:    History of colon polyps [Z86.0100]     Was patient informed to call insurance with codes (Y/N):  Yes, I confirmed Medicare insurance with the patient.     Referral sent?:  N/A    EM or [facility] notified?:  I sent an electronic request to Endo Scheduling and received a confirmation today.      Medication Orders:  This patient verbally confirmed that she is not taking:    Iron, blood thinners, BP meds, and is not diabetic    No latex allergy, No PCN allergy and does not have a pacemaker     Misc Orders:       Further instructions given by staff:   I discussed the prep instructions with the patient which she verbally understood and is aware that I will mail the instructions today    Advised patient:    You will not be able to drive, operate machinery or make critical decisions the day of your procedure. Please make arrangements for transportation. You must have a  (age 18 or older) to accompany you, and drive you home after the procedure.  You cannot use public transportation (Uber, Lyft, Taxi). The procedure involves sedation, and you will not be allowed to leave unaccompanied. Your procedure will not proceed forward if you're unable to confirm your  planned to escort you home.    Advised Patient:    [facility] requires payment of copay and any patient responsibility at the time of registration.   The [facility] requires copay and 50% of the patient responsibility at the time of service for all Esophagogastroduodenoscopy and diagnostic Colonoscopies.     They do offer payment plans and Care Credit options if unable to pay the full amount at the time of registration.     If you have any questions regarding your  potential responsibility, please contact [facility] Insurance Department at [phone] option 1.    You may receive 4 bills related to your medical procedure:   [facility] (the facility)  The procedural physician  The anesthesiologist  The pathology lab (if applicable)

## 2025-02-11 ENCOUNTER — TELEPHONE (OUTPATIENT)
Facility: CLINIC | Age: 80
End: 2025-02-11

## 2025-02-11 NOTE — TELEPHONE ENCOUNTER
Patient daughter is wondering if patient can add Esophagogastroduodenoscopy to the upcoming procedure please follow up

## 2025-02-12 NOTE — TELEPHONE ENCOUNTER
WILD Claudio. Pt scheduled for clinic appt with [facility] on 3/17/25 to evaluate symptoms/possible need to add EGD.  Pt ok with rescheduling if needs EGD and colon.  Thanks,  Bekah    RN called and spoke to pt's daughter Margi. Margi states that pt still c/o discomfort in esophagus after eating and a \"fullness, same as before\". LOV with GI in 2020. EGD/colon done in 2021.    Given that pt c/o heartburn despite being on omeprazole 20 mg daily per PCP for years, clinic appt recommended. Can determine if med needs adjustment and/or EGD is needed.     Pt scheduled for clinic appt with [facility] on 3/17/25. Date, time, and location verified with daughter. She verbalized understanding.    Your Appointments      Monday March 17, 2025 4:00 PM  Follow Up Visit with UNRULY Modi  [facility] ([facility]) [address] [address]  [phone]

## 2025-02-12 NOTE — TELEPHONE ENCOUNTER
FYI.. patient is scheduled for 4/15 and schedule is full. If ok to add we will need to reschedule

## 2025-03-10 NOTE — PROGRESS NOTES
[facility] - Gastroenterology                                                                                                               Reason for consult: eval    Requesting physician or provider: Mary Walter MD    Chief Complaint   Patient presents with    Follow - Up       HPI:   Maribel Blanco is a 79 year old year-old female with history of gerd, osteoporosis:    she is here today for f/U  She is here today with her daughter who is interpreting in German    Known to Dr. Montesinos  Elevated lfts - normal 7/2024  Ultrasound 8/2024      Impression   CONCLUSION:  1. No acute intra-abdominal process is identified by sonographic technique. The etiology of the patient's symptoms is unclear from this study.     2. Sonographic features suggesting mild hepatic steatosis.     3. Negative for hepatobiliary dilatation.     4. Lesser incidental findings as above.          Planning to cln  Wants repeat egd too    she moves her bowels daily to every other day. She uses prunes prn for constipation.  she denies brbpr and/or melena.    She endorses gerd.  Has bloating and fullness.  was taking omeprazole 20 mg daily and increased to bid 1-2 mos ago w/ some improvement in gerd. No change in bloating and fulness.   she denies dysphagia, odynophagia and/or globus.  She feels like food sits in epigastric area and has discomfort.  she denies nausea and/or vomiting.  she denies recent change in appetite and/or unintentional weight loss.    NSAIDS: no  Tobacco: no  Alcohol: no  Marijuana: no  Illicit drugs: no    No FH GI malignancy    No history of adverse reaction to sedation  No NICOLE  No anticoagulants  No pacemaker/defibrillator  No pain medications and/or sleep aides    Prior endoscopies:  2016 colonoscopy-- TA, recall 5 years  Anesthesia complication with N/V       Cln/egd 1/2021  EGD findings:       1. Esophagus: The squamocolumnar  junction was noted at 30 cm and appeared regular. The GE junction was noted at 30 cm from the incisors. No significant hiatal hernia. The esophageal mucosa appeared normal. There was no evidence of esophagitis, stricture or endoscopic evidence of Norman's esophagus.  2. Stomach: The stomach distended normally. Normal rugal folds were seen. The pylorus was patent. The gastric mucosa appeared erythematous so biopsies taken for H pylori. Retroflexion revealed a normal fundus and a normal cardia.   3. Duodenum: The duodenal mucosa appeared normal in the 1st and 2nd portion of the duodenum.      Colonoscopy findings:     1. DAVID: normal rectal tone, no masses palpated.   2. 2 polyp(s) noted as follows:      A. 4 mm polyp in the ascending colon; flat morphology; cold snare polypectomy and retrieved.      B. 4 mm polyp in the sigmoid colon; flat morphology, adenomatous appearing; cold snare polypectomy and retrieved.    3. Terminal ileum: the visualized mucosa appeared normal.  4. The colonic mucosa throughout the colon showed normal vascular pattern, without evidence of angioectasias or inflammation.   5. Diverticulosis: left sided.  6. A retroflexed view of the rectum revealed hemorrhoids.        Impression:  No erosive reflux     Recommend:  Await pathology. The interval for the next colonoscopy will be determined after reviewing pathology. If new signs or symptoms develop, colonoscopy may need to be repeated sooner.   Reflux precautions  High fiber diet.  Monitor for blood in the stool. If having more than just tinge of blood, call office or go to the ER.     Final Diagnosis:      A. Stomach; biopsy:  Gastric antral and oxyntic type mucosa with mild to moderate chronic gastritis  Helicobacter pylori immunohistochemical stain is pending negative     B. Ascending colon polyp x1; biopsy:  Tubular adenoma     C. Sigmoid colon polyp x1; biopsy:  Tubular adenoma       Wt Readings from Last 6 Encounters:   03/17/25 135 lb  (61.2 kg)   01/07/25 135 lb (61.2 kg)   07/29/24 139 lb (63 kg)   02/16/24 134 lb (60.8 kg)   02/02/24 134 lb (60.8 kg)   01/04/24 138 lb (62.6 kg)        History, Medications, Allergies, ROS:      Past Medical History:    Anesthesia complication    Arthritis    Cataract    Esophageal reflux    PONV (postoperative nausea and vomiting)      Past Surgical History:   Procedure Laterality Date    Arthroscopy of joint unlisted Left     knee    Cataract      Colonoscopy N/A 1/20/2021    Procedure: COLONOSCOPY;  Surgeon: Qasim Montesinos MD;  Location: [facility] ENDO    Knee arthroscopy Left 2002    right knee arthroscopy      Family Hx:   Family History   Family history unknown: Yes      Social History:   Social History     Socioeconomic History    Marital status:    Tobacco Use    Smoking status: Never     Passive exposure: Never    Smokeless tobacco: Never   Vaping Use    Vaping status: Never Used   Substance and Sexual Activity    Alcohol use: Yes     Comment: socially/ocassionally    Drug use: No        Medications (Active prior to today's visit):  Current Outpatient Medications   Medication Sig Dispense Refill    Omeprazole 40 MG Oral Capsule Delayed Release Take 1 capsule (40 mg total) by mouth in the morning and 1 capsule (40 mg total) before bedtime. Take 1 capsule by mouth daily before breakfast.. 60 capsule 0    Ascorbic Acid (VITAMIN C OR) Take 1 tablet by mouth daily.      Ergocalciferol (VITAMIN D OR) Take 1 tablet by mouth daily.      Calcium Carbonate 600 MG Oral Tab Take 1 tablet (600 mg total) by mouth daily.      alendronate 70 MG Oral Tab Take 1 tablet (70 mg total) by mouth once a week. 12 tablet 3    omeprazole 20 MG Oral Capsule Delayed Release Take 1 capsule (20 mg total) by mouth daily. 90 capsule 3    NIFEdipine ER 30 MG Oral Tablet 24 Hr Take 1 tablet (30 mg total) by mouth daily. 90 tablet 3    Na Sulfate-K Sulfate-Mg Sulf (SUPREP BOWEL PREP KIT) 17.5-3.13-1.6 GM/177ML Oral Solution Take as  directed (Patient not taking: Reported on 3/17/2025) 708 mL 0       Allergies:  Allergies[1]    ROS:   CONSTITUTIONAL: negative for fevers, chills, sweats and weight loss  EYES Negative for red eyes, yellow eyes, changes in vision  HEENT: Negative for dysphagia and hoarseness  RESPIRATORY: Negative for cough and shortness of breath  CARDIOVASCULAR: Negative for chest pain, palpitations  GASTROINTESTINAL: See HPI  GENITOURINARY: Negative for dysuria and frequency  MUSCULOSKELETAL: Negative for arthralgias and myalgias  NEUROLOGICAL: Negative for dizziness and headaches  BEHAVIOR/PSYCH: Negative for anxiety and poor appetite    PHYSICAL EXAM:   Blood pressure 145/74, pulse 87, height 4' 8\" (1.422 m), weight 135 lb (61.2 kg).    GEN: WD/WN, NAD  HEENT: Supple symmetrical, trachea midline  CV: RRR, the extremities are warm and well perfused   LUNGS: No increased work of breathing  ABDOMEN: No scars, normal bowel sounds, soft, non-tender, non-distended no rebound or guarding, no masses, no hepatomegaly  MSK: No redness, no warmth, no swelling of joints  SKIN: No jaundice, no erythema, no rashes  HEMATOLOGIC: No bleeding, no bruising  NEURO: Alert and interactive, normal gait    Labs/Imaging/Procedures:     Patient's pertinent labs and imaging were reviewed and discussed with patient today.        .  ASSESSMENT/PLAN:   Maribel Blanco is a 79 year old year-old female with history of gerd, osteoporosis:    #crc screening  Due 1/2024 for crc screening.    #constipation  Taking prunes. Think could be contributing to ugi complaints and advised use of miralax. Adjust as needed.    #hepatic steatosis  Noted on ultrasound 2024. Lfts normal 7/2024. No acute viral hepatitis on testing 2019. Recommendations below.    #gerd  #bloating  #epigastric pain  Sx while on omeprazole 20 mg daily. Dose increased to bid approx 1-2 mos ago and has had improvement in reflux, but no change in bloating and epigastric pain. Hpylori neg on egd 2021.  Weight stable. No melena. No fhx gi malignancy. Plan as below.consider ct pending sx and results of endoscopy.    -miralax 1 capful daily x 7 days and then adjust as needed  -low-fat diet  -healthy bmi  -monitor cholesterol, blood sugar, liver function  -consider further work-up if needed  -reflux diet modification  -avoid nsaids  -increase omeprazole to 40 mg twice daily x 4 weeks and then reduce dose to once daily  -hpylori testing    Add egd w/ mac to c-scope   Dx: gerd, bloating, epigastric pain        Orders This Visit:  Orders Placed This Encounter   Procedures    Helicobacter Pylori Breath Test, Adult       Meds This Visit:  Requested Prescriptions     Signed Prescriptions Disp Refills    Omeprazole 40 MG Oral Capsule Delayed Release 60 capsule 0     Sig: Take 1 capsule (40 mg total) by mouth in the morning and 1 capsule (40 mg total) before bedtime. Take 1 capsule by mouth daily before breakfast..       Imaging & Referrals:  None    ENDOSCOPIC RISK BENEFIT DISCUSSION: I described the procedure in great detail with the patient. I discussed the risks and benefits, including but not limited to: bleeding, perforation, infection, anesthesia complications, and even death. Patient will be NPO after midnight and will have a person physically present at time of pick-up to drive patient home. Patient verbalized understanding and agrees to proceed with procedure as planned.    Maegan Naranjo, APRN   3/10/2025        This note was partially prepared using Dragon Medical voice recognition dictation software. As a result, errors may occur. When identified, these errors have been corrected. While every attempt is made to correct errors during dictation, discrepancies may still exist.          [1] No Known Allergies

## 2025-03-17 ENCOUNTER — OFFICE VISIT (OUTPATIENT)
Facility: CLINIC | Age: 80
End: 2025-03-17

## 2025-03-17 ENCOUNTER — TELEPHONE (OUTPATIENT)
Facility: CLINIC | Age: 80
End: 2025-03-17

## 2025-03-17 VITALS
HEART RATE: 87 BPM | WEIGHT: 135 LBS | SYSTOLIC BLOOD PRESSURE: 145 MMHG | BODY MASS INDEX: 30.37 KG/M2 | HEIGHT: 56 IN | DIASTOLIC BLOOD PRESSURE: 74 MMHG

## 2025-03-17 DIAGNOSIS — R10.13 EPIGASTRIC PAIN: ICD-10-CM

## 2025-03-17 DIAGNOSIS — K21.9 GASTROESOPHAGEAL REFLUX DISEASE, UNSPECIFIED WHETHER ESOPHAGITIS PRESENT: Primary | ICD-10-CM

## 2025-03-17 DIAGNOSIS — K21.9 GASTROESOPHAGEAL REFLUX DISEASE, UNSPECIFIED WHETHER ESOPHAGITIS PRESENT: ICD-10-CM

## 2025-03-17 DIAGNOSIS — R14.0 BLOATING: ICD-10-CM

## 2025-03-17 DIAGNOSIS — K59.00 CONSTIPATION, UNSPECIFIED CONSTIPATION TYPE: ICD-10-CM

## 2025-03-17 DIAGNOSIS — Z12.11 COLON CANCER SCREENING: Primary | ICD-10-CM

## 2025-03-17 RX ORDER — OMEPRAZOLE 40 MG/1
40 CAPSULE, DELAYED RELEASE ORAL 2 TIMES DAILY
Qty: 60 CAPSULE | Refills: 0 | Status: SHIPPED | OUTPATIENT
Start: 2025-03-17 | End: 2025-04-16

## 2025-03-17 NOTE — PATIENT INSTRUCTIONS
-miralax 1 capful daily x 7 days and then adjust as needed  -low-fat diet  -healthy bmi  -monitor cholesterol, blood sugar, liver function  -consider further work-up if needed  -increase omeprazole to 40 mg twice daily x 4 weeks and then reduce dose to once daily  -hpylori testing    Add egd w/ mac to c-scope   Dx: gerd, bloating, epigastric pain        Consejos para controlar el reflujo de ácido (agruras)   Para controlar el reflujo de ácido es necesario hacer algunos cambios básicos en la alimentación y el estilo de arie. Los siguientes consejos pueden ser suficientes para aliviar el malestar.   Preste atención a lo que come  No ingiera comidas grasosas o muy condimentadas.  Coma menos alimentos ácidos, becky cítricos y alimentos a base de tomates, ya que pueden agravar los síntomas.  Limite el consumo de alcohol, cafeína y bebidas gaseosas. Todas estas bebidas aumentan el reflujo.  Intente limitar el consumo de chocolate, menta y hierbabuena. Winnie puede empeorar el reflujo de ácido en algunas personas.     Vigile cuándo come  No se acueste dianne 3 horas después de vivian comido.  No coma nada antes de irse a la cama.     Mantenga la gris levantada  Mantener la gris y el pecho elevados unas 4 a 6 pulgadas lo ayudará a aliviar el reflujo cuando esté acostado. Ponga soportes debajo de la cabecera de la cama o oniel cuña debajo del colchón para elevarlos.     Otros cambios  Baje de peso, si es necesario.  No laurence ejercicio poco antes de acostarse.  No use ropa ajustada.  Limite el uso de aspirina e ibuprofeno.  Deje de fumar.        © 7972-9284 [employer]. Todos los derechos reservados. Esta información no pretende sustituir la atención médica profesional. Sólo tierney médico puede diagnosticar y tratar un problema de ramon.

## 2025-03-17 NOTE — TELEPHONE ENCOUNTER
Patient was seen in office today with   UNRULY Jiménez and was given orders to schedule. Please call patient to schedule his/her procedure with the orders given below. Patient was given the phone number and prep instructions at the time of the office visit. The prep instructions was also explained to the patient at the time of the visit. The patient verbalized understanding the prep and that a GI  will call him/her for the procedure.  If patient calls before the 1 week turnaround please schedule with the orders given below, thank you!    Orders from UNRULY Jiménez   Patient scheduled on 4/15/2025 @ [facility]      Add egd w/ mac to c-scope   Dx: gerd, bloating, epigastric pain

## 2025-03-19 NOTE — TELEPHONE ENCOUNTER
Scheduled for:Esophagogastroduodenoscopy 49419    Provider Name:  Dr Montesinos    Date:  3/26/2025    Location:    [facility]    Sedation:  MAC    Time:  11:35 am (Patient made aware [facility] will call the day before with procedure/arrival time     Prep:  NPO after midnight    Meds/Allergies Reconciled?:  Physician reviewed     Diagnosis with codes:    Gastroesophageal reflux disease, unspecified whether esophagitis present [K21.9]   Bloating [R14.0]       Was patient informed to call insurance with codes (Y/N):  Yes, I confirmed Medicare insurance with the patient.     Referral sent?:  N/A    [facility] or [facility] notified?:  I sent an electronic request to Endo Scheduling and received a confirmation today.      Medication Orders:  This patient verbally confirmed that he is not taking:    Iron, blood thinners, BP meds, and is not diabetic    No latex allergy, No PCN allergy and does not have a pacemaker     Misc Orders:       Further instructions given by staff:   I discussed the prep instructions with the patient which she verbally understood and is aware prep instructions are available for  at the .    Advised patient:    You will not be able to drive, operate machinery or make critical decisions the day of your procedure. Please make arrangements for transportation. You must have a  (age 18 or older) to accompany you, stay in the facility for the duration of your procedure and drive you home after the procedure.  You cannot use public transportation (Uber, Lyft, Taxi). The procedure involves sedation, and you will not be allowed to leave unaccompanied. Your procedure will not proceed forward if you're unable to confirm your  planned to escort you home.    Advised Patient:    [facility] requires payment of copay and any patient responsibility at the time of registration.   The [facility] requires copay and 50% of the patient responsibility at the time of service for all Esophagogastroduodenoscopy and diagnostic  Colonoscopies.     They do offer payment plans and Care Credit options if unable to pay the full amount at the time of registration.     If you have any questions regarding your potential responsibility, please contact [facility] Insurance Department at [phone] option 1.    You may receive 4 bills related to your medical procedure:   [facility] (the facility)  The procedural physician  The anesthesiologist  The pathology lab (if applicable)

## 2025-03-26 ENCOUNTER — HOSPITAL ENCOUNTER (OUTPATIENT)
Age: 80
Setting detail: HOSPITAL OUTPATIENT SURGERY
Discharge: HOME | End: 2025-03-26
Attending: INTERNAL MEDICINE | Admitting: INTERNAL MEDICINE
Payer: MEDICARE

## 2025-03-26 ENCOUNTER — ANESTHESIA EVENT (OUTPATIENT)
Dept: ENDOSCOPY | Age: 80
End: 2025-03-26
Payer: MEDICARE

## 2025-03-26 ENCOUNTER — ANESTHESIA (OUTPATIENT)
Dept: ENDOSCOPY | Age: 80
End: 2025-03-26
Payer: MEDICARE

## 2025-03-26 VITALS
SYSTOLIC BLOOD PRESSURE: 126 MMHG | HEIGHT: 56 IN | HEART RATE: 68 BPM | DIASTOLIC BLOOD PRESSURE: 63 MMHG | OXYGEN SATURATION: 95 % | BODY MASS INDEX: 30.37 KG/M2 | RESPIRATION RATE: 17 BRPM | WEIGHT: 135 LBS

## 2025-03-26 DIAGNOSIS — R10.13 EPIGASTRIC PAIN: ICD-10-CM

## 2025-03-26 DIAGNOSIS — R14.0 BLOATING: ICD-10-CM

## 2025-03-26 DIAGNOSIS — K21.9 GASTROESOPHAGEAL REFLUX DISEASE, UNSPECIFIED WHETHER ESOPHAGITIS PRESENT: ICD-10-CM

## 2025-03-26 PROBLEM — K31.9 GASTRIC ERYTHEMA: Status: ACTIVE | Noted: 2025-03-26

## 2025-03-26 PROBLEM — K44.9 LARGE HIATAL HERNIA: Status: ACTIVE | Noted: 2025-03-26

## 2025-03-26 PROCEDURE — 88342 IMHCHEM/IMCYTCHM 1ST ANTB: CPT | Performed by: INTERNAL MEDICINE

## 2025-03-26 PROCEDURE — 99070 SPECIAL SUPPLIES PHYS/QHP: CPT | Performed by: INTERNAL MEDICINE

## 2025-03-26 PROCEDURE — 88305 TISSUE EXAM BY PATHOLOGIST: CPT | Performed by: INTERNAL MEDICINE

## 2025-03-26 PROCEDURE — 88312 SPECIAL STAINS GROUP 1: CPT | Performed by: INTERNAL MEDICINE

## 2025-03-26 PROCEDURE — 43239 EGD BIOPSY SINGLE/MULTIPLE: CPT | Performed by: INTERNAL MEDICINE

## 2025-03-26 RX ORDER — LIDOCAINE HYDROCHLORIDE 10 MG/ML
INJECTION, SOLUTION EPIDURAL; INFILTRATION; INTRACAUDAL; PERINEURAL AS NEEDED
Status: DISCONTINUED | OUTPATIENT
Start: 2025-03-26 | End: 2025-03-26 | Stop reason: SURG

## 2025-03-26 RX ORDER — NALOXONE HYDROCHLORIDE 0.4 MG/ML
0.08 INJECTION, SOLUTION INTRAMUSCULAR; INTRAVENOUS; SUBCUTANEOUS AS NEEDED
Status: DISCONTINUED | OUTPATIENT
Start: 2025-03-26 | End: 2025-03-26

## 2025-03-26 RX ORDER — SODIUM CHLORIDE, SODIUM LACTATE, POTASSIUM CHLORIDE, CALCIUM CHLORIDE 600; 310; 30; 20 MG/100ML; MG/100ML; MG/100ML; MG/100ML
INJECTION, SOLUTION INTRAVENOUS CONTINUOUS
Status: DISCONTINUED | OUTPATIENT
Start: 2025-03-26 | End: 2025-03-26

## 2025-03-26 RX ORDER — OMEPRAZOLE 40 MG/1
40 CAPSULE, DELAYED RELEASE ORAL 2 TIMES DAILY
Qty: 60 CAPSULE | Refills: 0 | Status: SHIPPED | OUTPATIENT
Start: 2025-03-26 | End: 2025-04-25

## 2025-03-26 RX ADMIN — SODIUM CHLORIDE, SODIUM LACTATE, POTASSIUM CHLORIDE, CALCIUM CHLORIDE: 600; 310; 30; 20 INJECTION, SOLUTION INTRAVENOUS at 11:00:00

## 2025-03-26 RX ADMIN — SODIUM CHLORIDE, SODIUM LACTATE, POTASSIUM CHLORIDE, CALCIUM CHLORIDE: 600; 310; 30; 20 INJECTION, SOLUTION INTRAVENOUS at 11:19:00

## 2025-03-26 RX ADMIN — LIDOCAINE HYDROCHLORIDE 40 MG: 10 INJECTION, SOLUTION EPIDURAL; INFILTRATION; INTRACAUDAL; PERINEURAL at 11:07:00

## 2025-03-26 NOTE — DISCHARGE INSTRUCTIONS
Home Care Instructions for Gastroscopy with Sedation    Diet:  - Resume your regular diet as tolerated unless otherwise instructed.  - Start with light meals to minimize bloating.  - Do not drink alcohol today.    Medication:  - If you have questions about resuming your normal medications, please contact your Primary Care Physician.    Activities:  - Take it easy today. Do not return to work today.  - Do not drive today.  - Do not operate any machinery today (including kitchen equipment).      Gastroscopy:  - You may have a sore throat for 2-3 days following the exam. This is normal. Gargling with warm salt water (1/2 tsp salt to 1 glass warm water) or using throat lozenges will help.  - If you experience any sharp pain in your neck, abdomen or chest, vomiting of blood, oral temperature over 100 degrees Fahrenheit, light-headedness or dizziness, or any other problems, contact your doctor.    **If unable to reach your doctor, please go to the [facility] Emergency Room**    - Your referring physician will receive a full report of your examination.  - If you do not hear from your doctor's office within two weeks of your biopsy, please call them for your results.    You may be able to see your laboratory results in Fronto between 4 and 7 business days.  In some cases, your physician may not have viewed the results before they are released to Fronto.  If you have questions regarding your results contact the physician who ordered the test/exam by phone or via Fronto by choosing \"Ask a Medical Question.\"

## 2025-03-26 NOTE — ANESTHESIA PREPROCEDURE EVALUATION
Anesthesia PreOp Note    HPI:     Maribel Blanco is a 79 year old female who presents for preoperative consultation requested by: Qasim Montesinos MD    Date of Surgery: 3/26/2025    Procedure(s):  ESOPHAGOGASTRODUODENOSCOPY  Indication: Gastroesophageal reflux disease, unspecified whether esophagitis present / Bloating /Epigastric pain    Relevant Problems   No relevant active problems       NPO:  Last Liquid Consumption Date: 03/25/25  Last Liquid Consumption Time: 1500  Last Solid Consumption Date: 03/25/25  Last Solid Consumption Time: 1500  Last Liquid Consumption Date: 03/25/25          History Review:  Patient Active Problem List    Diagnosis Date Noted    Hypertension, unspecified type 09/18/2023    Cervical radiculopathy 09/18/2023    Radius and ulna distal fracture 02/12/2017    Osteoporosis 10/03/2016    Urinary incontinence 10/03/2016       Past Medical History:    Anesthesia complication    Arthritis    Cataract    Esophageal reflux    High blood pressure    PONV (postoperative nausea and vomiting)       Past Surgical History:   Procedure Laterality Date    Arthroscopy of joint unlisted Left     knee    Cataract      Colonoscopy N/A 1/20/2021    Procedure: COLONOSCOPY;  Surgeon: Qasim Montesinos MD;  Location: [facility] ENDO    Knee arthroscopy Left 2002    right knee arthroscopy       Prescriptions Prior to Admission[1]  Current Medications and Prescriptions Ordered in Epic[2]    Allergies[3]    Family History   Family history unknown: Yes     Social History     Socioeconomic History    Marital status:    Tobacco Use    Smoking status: Never     Passive exposure: Never    Smokeless tobacco: Never   Vaping Use    Vaping status: Never Used   Substance and Sexual Activity    Alcohol use: Yes     Comment: socially/ocassionally    Drug use: No       Available pre-op labs reviewed.             Vital Signs:  Body mass index is 30.27 kg/m².   height is 1.422 m (4' 8\") and weight is 61.2 kg (135 lb).   Vitals:     03/21/25 1410 03/26/25 1029   Weight: 61.2 kg (135 lb) 61.2 kg (135 lb)   Height: 1.422 m (4' 8\") 1.422 m (4' 8\")        Anesthesia Evaluation     Patient summary reviewed and Nursing notes reviewed    History of anesthetic complications   Airway   Mallampati: III  TM distance: <3 FB  Neck ROM: full  Dental      Pulmonary - normal exam    breath sounds clear to auscultation  Cardiovascular   Exercise tolerance: poor  (+) hypertension well controlled    Rhythm: regular  Rate: normal    Neuro/Psych    (+)  neuromuscular disease,        GI/Hepatic/Renal    (+) GERD well controlled    Endo/Other    Abdominal                  Anesthesia Plan:   ASA:  2  Plan:   MAC  Plan Comments: PONV severe, edentulous.  Informed Consent Plan and Risks Discussed With:  Patient, child/children and       I have informed Maribel Blanco and/or legal guardian or family member of the nature of the anesthetic plan, benefits, risks including possible dental damage if relevant, major complications, and any alternative forms of anesthetic management.   All of the patient's questions were answered to the best of my ability. The patient desires the anesthetic management as planned.  Toribio Bryant MD  3/26/2025 10:38 AM  Present on Admission:  **None**           [1]   Medications Prior to Admission   Medication Sig Dispense Refill Last Dose/Taking    Omeprazole 40 MG Oral Capsule Delayed Release Take 1 capsule (40 mg total) by mouth in the morning and 1 capsule (40 mg total) before bedtime. Take 1 capsule by mouth daily before breakfast.. (Patient taking differently: Take 1 capsule (40 mg total) by mouth in the morning and 1 capsule (40 mg total) before bedtime. Currently, taking 40 mg in the morning..) 60 capsule 0 Taking Differently    Ascorbic Acid (VITAMIN C OR) Take 1 tablet by mouth daily.   Taking    Ergocalciferol (VITAMIN D OR) Take 1 tablet by mouth daily.   Taking    alendronate 70 MG Oral Tab Take 1 tablet (70 mg total) by  mouth once a week. (Patient taking differently: Take 1 tablet (70 mg total) by mouth Every Monday.) 12 tablet 3 Taking Differently    NIFEdipine ER 30 MG Oral Tablet 24 Hr Take 1 tablet (30 mg total) by mouth daily. 90 tablet 3 3/25/2025    Na Sulfate-K Sulfate-Mg Sulf (SUPREP BOWEL PREP KIT) 17.5-3.13-1.6 GM/177ML Oral Solution Take as directed (Patient not taking: Reported on 3/17/2025) 708 mL 0     Calcium Carbonate 600 MG Oral Tab Take 1 tablet (600 mg total) by mouth daily. (Patient not taking: Reported on 3/21/2025)   Not Taking    [] Zoster Vac Recomb Adjuvanted (SHINGRIX) 50 MCG/0.5ML Intramuscular Recon Susp Inject 0.5 mL into the muscle one time for 1 dose. 1 each 1    [2]   Current Facility-Administered Medications Ordered in Epic   Medication Dose Route Frequency Provider Last Rate Last Admin    lactated ringers infusion   Intravenous Continuous Qasim Montesinos MD         No current [facility]-ordered outpatient medications on file.   [3] No Known Allergies

## 2025-03-26 NOTE — OPERATIVE REPORT
ESOPHAGOGASTRODUODENOSCOPY (EGD) REPORT    Maribel Blanco     10/10/1945 Age 79 year old   PCP Mary Walter MD Endoscopist Qasim Montesinos MD       Date of procedure: 25    Procedure: EGD w/biopsies    Pre-operative diagnosis: #gerd  #bloating  #epigastric pain    Post-operative diagnosis: large hiatal hernia and gastric erythema    Medications: Mac sedation    Complications: none    Procedure:  Informed consent was obtained from the patient after the risks of the procedure were discussed, including but not limited to bleeding, perforation, aspiration, infection, or possibility of a missed lesion. After discussions of the risks/benefits and alternatives to this procedure, as well as the planned sedation, the patient was placed in the left lateral decubitus position and begun on continuous blood pressure pulse oximetry and EKG monitoring and this was maintained throughout the procedure. Once an adequate level of sedation was obtained a bite block was placed. Then the lubricated tip of the [id number] diagnostic video upper endoscope was inserted and advanced using direct visualization into the posterior pharynx and ultimately into the esophagus.    Complications: None    Findings:      1. Esophagus: The squamocolumnar junction was noted at 26 cm and appeared regular. The GE junction was noted at 26 cm from the incisors. Hiatus was at 31 cm so there was a large 4-5 cm hiatal hernia. The esophageal mucosa appeared otherwise normal. There was no endoscopic findings of esophagitis, stricture or Norman's esophagus.    2. Stomach: The stomach distended normally. Normal rugal folds were seen. The pylorus was patent. The gastric mucosa appeared erythematous so biopsied to rule out Hpylori. Retroflexion revealed a normal fundus and a normal-patulous cardia.     3. Duodenum: The duodenal mucosa appeared normal in the 1st and 2nd portion of the duodenum.     Impression:  1. Likely reflux and pain given large hiatal  hernia    Recommend:  1. Here are some reflux precautions:   - Avoid trigger foods  - Anti-reflux measures: raising the head of the bed at night, avoiding tight clothing or belts, avoiding eating late at night and not lying down shortly after mealtime and achieving weight loss   - Avoid NSAID's, caffeine, peppermints, alcohol, tobacco and foods that incite symptoms   2. Continue PPI daily before breakfast    >>>If tissue was sampled/removed and you have not received your pathology results either by phone or letter within 2 weeks, please call our office at [phone].    Specimens:  Duodenal biopsies and gastric biopsies

## 2025-03-26 NOTE — H&P
Pre Procedure History & Physical Examination    Patient Name: Maribel Blanco  MRN: [id number]  CSN: [id number]  YOB: 1945    Diagnosis: #gerd  #bloating  #epigastric pain    Prescriptions Prior to Admission[1]  Current Facility-Administered Medications   Medication Dose Route Frequency    lactated ringers infusion   Intravenous Continuous       Allergies: Allergies[2]    Past Medical History:    Anesthesia complication    Arthritis    Cataract    Esophageal reflux    High blood pressure    PONV (postoperative nausea and vomiting)     Past Surgical History:   Procedure Laterality Date    Arthroscopy of joint unlisted Left     knee    Cataract      Colonoscopy N/A 1/20/2021    Procedure: COLONOSCOPY;  Surgeon: Qasim Montesinos MD;  [facility]    Knee arthroscopy Left 2002    right knee arthroscopy     Family History   Family history unknown: Yes     Social History     Tobacco Use    Smoking status: Never     Passive exposure: Never    Smokeless tobacco: Never   Substance Use Topics    Alcohol use: Yes     Comment: socially/ocassionally         ROS:   CONSTITUTIONAL:  negative for fevers, rigors  EYES:  negative for diplopia   RESPIRATORY:  negative for severe shortness of breath  CARDIOVASCULAR:  negative for crushing sub-sternal chest pain  GASTROINTESTINAL:  see HPI  GENITOURINARY:  negative for dysuria or gross hematuria  INTEGUMENT/BREAST:  SKIN:  negative for jaundice   ALLERGIC/IMMUNOLOGIC:  negative for hay fever  ENDOCRINE:  negative for cold intolerance and heat intolerance  MUSCULOSKELETAL:  negative for joint effusion/severe erythema  BEHAVIOR/PSYCH:  negative for psychotic behavior      PHYSICAL EXAM:   /63 (BP Location: Left arm)   Pulse 68   Resp 17   Ht 4' 8\" (1.422 m)   Wt 135 lb (61.2 kg)   SpO2 95%   BMI 30.27 kg/m²       Gen: Patient appears comfortable and in no acute discomfort  HEENT: the sclera appears anicteric, oropharynx clear, mucus membranes appear moist  CV:  regular rate and rhythm, the extremities are warm and well perfused   Lung: Moves air well; No labored breathing  Abdomen: soft, non-tender exam in all quadrants without rigidity or guarding, non-distended, no abnormal bowel sounds noted, no masses are palpated  Skin: No jaundice  Ext: no cyanosis, clubbing or edema is evident.   Neuro: Alert and interactive, and gross movements of extremities normal    I have discussed the risks and benefits and alternatives of the procedure with the patient/family.  They understand and agree to proceed with plan of care.   I have reviewed recent H&P/clinic notes  Qasim Montesinos MD  [facility]  3/26/2025  11:00 AM         [1]   Medications Prior to Admission   Medication Sig Dispense Refill Last Dose/Taking    Omeprazole 40 MG Oral Capsule Delayed Release Take 1 capsule (40 mg total) by mouth in the morning and 1 capsule (40 mg total) before bedtime. Take 1 capsule by mouth daily before breakfast.. (Patient taking differently: Take 1 capsule (40 mg total) by mouth in the morning and 1 capsule (40 mg total) before bedtime. Currently, taking 40 mg in the morning..) 60 capsule 0 Taking Differently    Ascorbic Acid (VITAMIN C OR) Take 1 tablet by mouth daily.   Taking    Ergocalciferol (VITAMIN D OR) Take 1 tablet by mouth daily.   Taking    alendronate 70 MG Oral Tab Take 1 tablet (70 mg total) by mouth once a week. (Patient taking differently: Take 1 tablet (70 mg total) by mouth Every Monday.) 12 tablet 3 Taking Differently    NIFEdipine ER 30 MG Oral Tablet 24 Hr Take 1 tablet (30 mg total) by mouth daily. 90 tablet 3 3/25/2025    Na Sulfate-K Sulfate-Mg Sulf (SUPREP BOWEL PREP KIT) 17.5-3.13-1.6 GM/177ML Oral Solution Take as directed (Patient not taking: Reported on 3/17/2025) 708 mL 0     Calcium Carbonate 600 MG Oral Tab Take 1 tablet (600 mg total) by mouth daily. (Patient not taking: Reported on 3/21/2025)   Not Taking    [] Zoster Vac Recomb  Adjuvanted (SHINGRIX) 50 MCG/0.5ML Intramuscular Recon Susp Inject 0.5 mL into the muscle one time for 1 dose. 1 each 1    [2] No Known Allergies

## 2025-03-26 NOTE — ANESTHESIA POSTPROCEDURE EVALUATION
Patient: Maribel Blanco    Procedure Summary       Date: 03/26/25 Room / Location: [facility] ENDOSCOPY 01 / [facility] ENDO    Anesthesia Start: 1106 Anesthesia Stop: 1124    Procedure: ESOPHAGOGASTRODUODENOSCOPY with biopsy Diagnosis:       Gastroesophageal reflux disease, unspecified whether esophagitis present      Bloating      Epigastric pain      (Hiatel hernia)    Surgeons: Qasim Montesinos MD Anesthesiologist: Toribio Bryant MD    Anesthesia Type: MAC ASA Status: 2            Anesthesia Type: MAC    Vitals Value Taken Time   /63 03/26/25 1124   Temp pending 03/26/25 1124   Pulse 76 03/26/25 1124   Resp 16 03/26/25 1124   SpO2 96% room air 03/26/25 1124       [facility] AN Post Evaluation:   Patient Evaluated in PACU  Patient Participation: complete - patient participated  Level of Consciousness: awake and alert  Pain Score: 0  Pain Management: adequate  Airway Patency:  Dental exam unchanged from preop  Yes    Nausea/Vomiting: none  Cardiovascular Status: acceptable, blood pressure returned to baseline and hemodynamically stable  Respiratory Status: acceptable  Postoperative Hydration acceptable  Comments: Wide awake, surprised that we are done.  No recall, no nausea at all.  No pain.  Voice and vision intact.      Toribio Bryant MD  3/26/2025 11:24 AM

## 2025-04-01 ENCOUNTER — TELEPHONE (OUTPATIENT)
Facility: CLINIC | Age: 80
End: 2025-04-01

## 2025-04-01 NOTE — TELEPHONE ENCOUNTER
----- Message from Qasim Jaguar sent at 3/28/2025 12:36 PM CDT -----  Discussed with daughters  Symptoms likely from acid reflux and constipation  There is IM so depending on wishes can follow up with EGD in 1 year with mapping but would discuss in office. Nonurgent, within 6 months is ok  Follow up with Maegan Naranjo, NP

## 2025-04-04 NOTE — TELEPHONE ENCOUNTER
RN called and spoke with pt with  [phone]. Reviewed MD recs per below. RN offered to schedule clinic appt with APN but pt declined, states she prefers to call back and schedule office appt after colonoscopy is done. RN provided GI office number.    Recall EGD in 1 year depending on pt wishes per Dr. Montesinos. EGD done on 3/26/25.     Message sent to pt outreach and specialty comments updated.

## 2025-04-11 ENCOUNTER — TELEPHONE (OUTPATIENT)
Facility: CLINIC | Age: 80
End: 2025-04-11

## 2025-04-11 RX ORDER — OMEPRAZOLE 40 MG/1
40 CAPSULE, DELAYED RELEASE ORAL DAILY
Qty: 30 CAPSULE | Refills: 5 | Status: SHIPPED | OUTPATIENT
Start: 2025-04-11 | End: 2025-05-11

## 2025-04-11 NOTE — TELEPHONE ENCOUNTER
RN refilled med for once daily use. Per 3/17/25 clinic appt note, pt was to increase omeprazole to BID x 4 weeks then go back down to once daily.      EGD done on 3/26/25 recommendations also say to \"continue PPI daily before breakfast\".

## 2025-04-11 NOTE — TELEPHONE ENCOUNTER
Current Outpatient Medications   Medication Sig Dispense Refill    Omeprazole 40 MG Oral Capsule Delayed Release Take 1 capsule (40 mg total) by mouth in the morning and 1 capsule (40 mg total) before bedtime. Currently, taking 40 mg in the morning.. 60 capsule 0

## 2025-04-11 NOTE — TELEPHONE ENCOUNTER
Returned patient daughter call (ok per phone consent) reviewed prep instructions; offered to leave them at  for  on Monday. Patient daughter declined states she has to work.     Discussed prep; advised prep instructions  were mailed in January.     Daughter will discuss with patient;

## 2025-04-11 NOTE — TELEPHONE ENCOUNTER
Patient's daughter would like to go over the instructions for the colonoscopy preparation for next weeks procedure.

## 2025-04-15 PROBLEM — K62.1 COLORECTAL POLYPS: Status: ACTIVE | Noted: 2025-04-15

## 2025-04-15 PROBLEM — K57.30 DIVERTICULOSIS OF COLON: Status: ACTIVE | Noted: 2025-04-15

## 2025-04-15 PROBLEM — K63.5 COLORECTAL POLYPS: Status: ACTIVE | Noted: 2025-04-15

## 2025-04-15 PROBLEM — K64.9 HEMORRHOIDS: Status: ACTIVE | Noted: 2025-04-15

## 2025-04-15 PROCEDURE — 88305 TISSUE EXAM BY PATHOLOGIST: CPT | Performed by: INTERNAL MEDICINE

## 2025-04-16 ENCOUNTER — TELEPHONE (OUTPATIENT)
Facility: CLINIC | Age: 80
End: 2025-04-16

## 2025-04-16 NOTE — TELEPHONE ENCOUNTER
----- Message from Qasim Montesinos sent at 4/16/2025 10:41 AM CDT -----  Discussed with daughter  No further screening colonoscopies  Please update health history  ----- Message -----  From: Lab, Background User  Sent: 4/16/2025  10:14 AM CDT  To: Qasim Montesinos MD

## 2025-04-22 ENCOUNTER — TELEPHONE (OUTPATIENT)
Facility: CLINIC | Age: 80
End: 2025-04-22

## 2025-04-22 NOTE — TELEPHONE ENCOUNTER
1st,Overdue reminder letter sent out via My chart for the following:  Helicobacter Pylori Breath Test, Adult (Order #[id number]) on 3/17/25

## 2025-05-21 NOTE — TELEPHONE ENCOUNTER
RN called and spoke to pt with  [phone]. Discussed below MD message  and recs to scheduling a clinic visit to see if further EGD monitoring is needed and to evaluate if current med therapy is still effective. Pt states she is feeling well but was agreeable to scheduling a clinic appt.    Pt scheduled for clinic appt with APN on 8/4/25. Date, time, and location verified with pt. Provided GI office number if pt needs to reschedule. Pt verbalized understanding.     Time on call: 19 min    Your Appointments        Monday August 04, 2025 4:30 PM  Follow Up Visit with UNRULY oMdi  [facility] ([facility]) [address] [address]  [phone]

## 2025-06-10 ENCOUNTER — OFFICE VISIT (OUTPATIENT)
Dept: FAMILY MEDICINE CLINIC | Facility: CLINIC | Age: 80
End: 2025-06-10

## 2025-06-10 VITALS
HEIGHT: 58 IN | HEART RATE: 87 BPM | BODY MASS INDEX: 28.51 KG/M2 | WEIGHT: 135.81 LBS | SYSTOLIC BLOOD PRESSURE: 128 MMHG | DIASTOLIC BLOOD PRESSURE: 78 MMHG

## 2025-06-10 DIAGNOSIS — K21.00 GASTROESOPHAGEAL REFLUX DISEASE WITH ESOPHAGITIS WITHOUT HEMORRHAGE: ICD-10-CM

## 2025-06-10 DIAGNOSIS — R53.83 FATIGUE, UNSPECIFIED TYPE: Primary | ICD-10-CM

## 2025-06-10 DIAGNOSIS — K63.5 POLYP OF COLON, UNSPECIFIED PART OF COLON, UNSPECIFIED TYPE: ICD-10-CM

## 2025-06-10 DIAGNOSIS — K57.90 DIVERTICULOSIS: ICD-10-CM

## 2025-06-10 DIAGNOSIS — F32.A DEPRESSION, UNSPECIFIED DEPRESSION TYPE: ICD-10-CM

## 2025-06-10 DIAGNOSIS — K44.9 HIATAL HERNIA: ICD-10-CM

## 2025-06-10 PROCEDURE — 96372 THER/PROPH/DIAG INJ SC/IM: CPT | Performed by: FAMILY MEDICINE

## 2025-06-10 PROCEDURE — 99214 OFFICE O/P EST MOD 30 MIN: CPT | Performed by: FAMILY MEDICINE

## 2025-06-10 RX ORDER — OMEPRAZOLE 40 MG/1
40 CAPSULE, DELAYED RELEASE ORAL DAILY
COMMUNITY

## 2025-06-10 RX ORDER — CYANOCOBALAMIN 1000 UG/ML
1000 INJECTION, SOLUTION INTRAMUSCULAR; SUBCUTANEOUS ONCE
Status: COMPLETED | OUTPATIENT
Start: 2025-06-10 | End: 2025-06-10

## 2025-06-10 RX ADMIN — CYANOCOBALAMIN 1000 MCG: 1000 INJECTION, SOLUTION INTRAMUSCULAR; SUBCUTANEOUS at 16:05:00

## 2025-06-10 NOTE — PROGRESS NOTES
6/10/2025  3:43 PM    Maribel Blanco is a 79 year old female.    Chief complaint(s):   Chief Complaint   Patient presents with    Fatigue     C/o fatigue x 3 weeks    Test Results     Discuss EGD results      HPI:     Maribel Blanco primary complaint is regarding multiple complaints.     Patient is a 79-year-old female with history of hypertension who recently underwent a colonoscopy and EGD which shows to have colon polyps, diverticulosis, gastric reflux as well as hiatal hernia.  All been managed well with a PPI inhibitor.  Also complaining of fatigue as, low energy but normal appetite.  She has been feeling depressed with episodes of crying and decreased sleep.  Denies any feeling of self-worth or feeling that it would be better if she would not be alive.  Has never been on antidepressants in the past.  Denies any chest pain shortness of breath or palpitations.  There has been no fever and no pains.      HISTORY:  Past Medical History[1]   Past Surgical History[2]   Family History[3]   Social History: Short Social Hx on File[4]     Immunizations:   Immunization History   Administered Date(s) Administered    Covid-19 Vaccine Pfizer 30 mcg/0.3 ml 02/26/2021, 03/19/2021    Covid-19 Vaccine Pfizer Bladimir-Sucrose 30 mcg/0.3 ml 01/28/2022    FLUZONE 6 months and older PFS 0.5 ml (69680) 10/03/2016    High Dose Fluzone Influenza Vaccine, 65yr+ PF 0.5mL (02206) 01/07/2025    Zoster Vaccine Live (Zostavax) 10/03/2016       Medications (Active prior to today's visit):  Current Medications[5]    Allergies:  Allergies[6]      ROS:   Review of Systems   Constitutional:  Positive for fatigue. Negative for appetite change and fever.   Eyes:  Negative for visual disturbance.   Respiratory:  Negative for shortness of breath.    Cardiovascular:  Negative for chest pain.   Gastrointestinal:  Negative for abdominal pain, anal bleeding, blood in stool, nausea and vomiting.   Endocrine: Negative for polydipsia and polyuria.    Musculoskeletal:  Negative for back pain.   Skin:  Negative for rash.   Neurological:  Negative for dizziness and headaches.   Psychiatric/Behavioral:  Positive for decreased concentration.        PHYSICAL EXAM:   VS: /78 (BP Location: Right arm, Patient Position: Sitting, Cuff Size: adult)   Pulse 87   Ht 4' 10\" (1.473 m)   Wt 135 lb 12.8 oz (61.6 kg)   BMI 28.38 kg/m²     Physical Exam  Vitals reviewed.   Constitutional:       General: She is not in acute distress.     Appearance: Normal appearance.   HENT:      Head: Normocephalic.   Eyes:      Conjunctiva/sclera: Conjunctivae normal.   Cardiovascular:      Rate and Rhythm: Normal rate and regular rhythm.      Heart sounds: Normal heart sounds.   Pulmonary:      Effort: Pulmonary effort is normal.      Breath sounds: Normal breath sounds.   Musculoskeletal:      Cervical back: Neck supple.   Skin:     Findings: No rash.   Psychiatric:         Mood and Affect: Mood is depressed.         LABORATORY RESULTS:   No results found for: \"URCOLOR\", \"URCLA\", \"URINELEUK\", \"URINENITRITE\", \"URINEBLOOD\"   Results for orders placed or performed during the hospital encounter of 04/15/25   Specimen to Pathology Tissue    Collection Time: 04/15/25  8:29 AM   Result Value Ref Range    Case Report       Surgical Pathology                                Case: [id number]                                  Authorizing Provider:  Qasim Montesinos MD           Collected:           04/15/2025 08:29 AM          Ordering Location:     [facility]       Received:            04/15/2025 12:29 PM          Pathologist:           Riccardo Olvera MD                                                        Specimens:   A) - Colon ascending, Ascending colon polyp                                                         B) - Colon transverse, Transverse colon polyp                                              Final Diagnosis:         A. Ascending colon polyp:   Polypoid fragment of  colonic mucosa with focal hyperplastic change and prominent lymphoid aggregate in the lamina propria.    B. Transverse colon polyp:  Tubular adenoma.        Embedded Images      Clinical Information       Z86.0100 History Of Colon Polyps.         Gross Description       A.  The specimen is received in formalin labeled \"Francis, ascending colon polyp\" and consists of one fragment of pink-tan soft tissue measuring 0.2 cm in greatest dimension. The specimen is submitted entirely in one cassette.    B.  The specimen is received in formalin labeled \"Francis, transverse colon polyp\" and consists of one fragment of pink-tan soft tissue measuring 0.4 cm in greatest dimension. The specimen is submitted entirely in one cassette.  (linda Olvera M.D./mtt       Interpretation Benign         EKG / Spirometry : -     Radiology: No results found.     ASSESSMENT/PLAN:   Assessment   Encounter Diagnoses   Name Primary?    Fatigue, unspecified type Yes    Depression, unspecified depression type     Polyp of colon, unspecified part of colon, unspecified type     Diverticulosis     Gastroesophageal reflux disease with esophagitis without hemorrhage     Hiatal hernia        1. Fatigue, unspecified type  2. Depression, unspecified depression type    MEDICATIONS:   OTC MVI  Vit B12 1 ml IM given          LABORATORY & ORDERS:   Orders Placed This Encounter   Procedures    Comp Metabolic Panel (14)    CBC With Differential With Platelet    TSH W Reflex To Free T4       REFERRALS: OP REFERRAL TO [facility],         RECOMMENDATIONS given include: Patient was reassured of  her medical condition and all questions and concerns were answered. Patient was informed to please, call our office with any new or further questions or concerns that may come up in the near future. Notify Dr Norman or the [facility] if there is a deterioration or worsening of the medical condition. Also, inform the doctor with any new symptoms or medications' side  effects.      FOLLOW-UP: Schedule a follow-up visit in 3 weeks with her PCP.         3. Polyp of colon, unspecified part of colon, unspecified type  4. Diverticulosis  5. Gastroesophageal reflux disease with esophagitis without hemorrhage  6. Hiatal hernia  Doing well  CPM  Follow up prn         Orders This Visit:  Orders Placed This Encounter   Procedures    CBC With Differential With Platelet    Comp Metabolic Panel (14)    Hemoglobin       Meds This Visit:  Requested Prescriptions      No prescriptions requested or ordered in this encounter       Imaging & Referrals:  OP REFERRAL TO [facility] CLAUDE ACOSTA MD         [1]   Past Medical History:   Anesthesia complication    Arthritis    Cataract    Esophageal reflux    High blood pressure    PONV (postoperative nausea and vomiting)    n/v   [2]   Past Surgical History:  Procedure Laterality Date    Arthroscopy of joint unlisted Left     knee    Cataract      Colonoscopy N/A 1/20/2021    Procedure: COLONOSCOPY;  Surgeon: Qasim Montesinos MD;  Location: [facility]    Colonoscopy N/A 4/15/2025    Procedure: COLONOSCOPY;  Surgeon: Qasim Montesinos MD;  Location: [facility] MAIN OR    Knee arthroscopy Left 2002    right knee arthroscopy   [3]   Family History  Family history unknown: Yes   [4]   Social History  Socioeconomic History    Marital status:    Tobacco Use    Smoking status: Never     Passive exposure: Never    Smokeless tobacco: Never   Vaping Use    Vaping status: Never Used   Substance and Sexual Activity    Alcohol use: Yes     Comment: socially/ocassionally    Drug use: No   [5]   Current Outpatient Medications   Medication Sig Dispense Refill    Omeprazole 40 MG Oral Capsule Delayed Release Take 1 capsule (40 mg total) by mouth daily.      Ascorbic Acid (VITAMIN C OR) Take 1 tablet by mouth in the morning.      Ergocalciferol (VITAMIN D OR) Take 1 tablet by mouth in the morning.      alendronate 70 MG Oral Tab Take 1 tablet (70 mg total) by mouth  once a week. 12 tablet 3    NIFEdipine ER 30 MG Oral Tablet 24 Hr Take 1 tablet (30 mg total) by mouth daily. 90 tablet 3    Calcium Carbonate 600 MG Oral Tab Take 1 tablet (600 mg total) by mouth daily. (Patient not taking: Reported on 3/21/2025)     [6] No Known Allergies

## 2025-06-14 ENCOUNTER — LAB ENCOUNTER (OUTPATIENT)
Dept: LAB | Age: 80
End: 2025-06-14
Attending: FAMILY MEDICINE
Payer: MEDICARE

## 2025-06-14 LAB
ALBUMIN SERPL-MCNC: 4.6 G/DL (ref 3.2–4.8)
ALBUMIN/GLOB SERPL: 1.8 {RATIO} (ref 1–2)
ALP LIVER SERPL-CCNC: 63 U/L (ref 55–142)
ALT SERPL-CCNC: 10 U/L (ref 10–49)
ANION GAP SERPL CALC-SCNC: 12 MMOL/L (ref 0–18)
AST SERPL-CCNC: 26 U/L (ref ?–34)
BASOPHILS # BLD AUTO: 0.04 X10(3) UL (ref 0–0.2)
BASOPHILS NFR BLD AUTO: 0.6 %
BILIRUB SERPL-MCNC: 0.5 MG/DL (ref 0.2–1.1)
BUN BLD-MCNC: 17 MG/DL (ref 9–23)
BUN/CREAT SERPL: 20.5 (ref 10–20)
CALCIUM BLD-MCNC: 9.6 MG/DL (ref 8.7–10.4)
CHLORIDE SERPL-SCNC: 104 MMOL/L (ref 98–112)
CO2 SERPL-SCNC: 25 MMOL/L (ref 21–32)
CREAT BLD-MCNC: 0.83 MG/DL (ref 0.55–1.02)
DEPRECATED RDW RBC AUTO: 42.8 FL (ref 35.1–46.3)
EGFRCR SERPLBLD CKD-EPI 2021: 73 ML/MIN/1.73M2 (ref 60–?)
EOSINOPHIL # BLD AUTO: 0.12 X10(3) UL (ref 0–0.7)
EOSINOPHIL NFR BLD AUTO: 1.9 %
ERYTHROCYTE [DISTWIDTH] IN BLOOD BY AUTOMATED COUNT: 12.9 % (ref 11–15)
FASTING STATUS PATIENT QL REPORTED: YES
GLOBULIN PLAS-MCNC: 2.5 G/DL (ref 2–3.5)
GLUCOSE BLD-MCNC: 87 MG/DL (ref 70–99)
HCT VFR BLD AUTO: 39.6 % (ref 35–48)
HGB BLD-MCNC: 13.2 G/DL (ref 12–16)
IMM GRANULOCYTES # BLD AUTO: 0.01 X10(3) UL (ref 0–1)
IMM GRANULOCYTES NFR BLD: 0.2 %
LYMPHOCYTES # BLD AUTO: 1.9 X10(3) UL (ref 1–4)
LYMPHOCYTES NFR BLD AUTO: 29.9 %
MCH RBC QN AUTO: 30.3 PG (ref 26–34)
MCHC RBC AUTO-ENTMCNC: 33.3 G/DL (ref 31–37)
MCV RBC AUTO: 90.8 FL (ref 80–100)
MONOCYTES # BLD AUTO: 0.81 X10(3) UL (ref 0.1–1)
MONOCYTES NFR BLD AUTO: 12.8 %
NEUTROPHILS # BLD AUTO: 3.47 X10 (3) UL (ref 1.5–7.7)
NEUTROPHILS # BLD AUTO: 3.47 X10(3) UL (ref 1.5–7.7)
NEUTROPHILS NFR BLD AUTO: 54.6 %
OSMOLALITY SERPL CALC.SUM OF ELEC: 293 MOSM/KG (ref 275–295)
PLATELET # BLD AUTO: 243 10(3)UL (ref 150–450)
POTASSIUM SERPL-SCNC: 3.7 MMOL/L (ref 3.5–5.1)
PROT SERPL-MCNC: 7.1 G/DL (ref 5.7–8.2)
RBC # BLD AUTO: 4.36 X10(6)UL (ref 3.8–5.3)
SODIUM SERPL-SCNC: 141 MMOL/L (ref 136–145)
TSI SER-ACNC: 1.62 UIU/ML (ref 0.55–4.78)
WBC # BLD AUTO: 6.4 X10(3) UL (ref 4–11)

## 2025-06-14 PROCEDURE — 36415 COLL VENOUS BLD VENIPUNCTURE: CPT | Performed by: FAMILY MEDICINE

## 2025-06-14 PROCEDURE — 85025 COMPLETE CBC W/AUTO DIFF WBC: CPT | Performed by: FAMILY MEDICINE

## 2025-06-14 PROCEDURE — 80053 COMPREHEN METABOLIC PANEL: CPT | Performed by: FAMILY MEDICINE

## 2025-06-14 PROCEDURE — 84443 ASSAY THYROID STIM HORMONE: CPT | Performed by: FAMILY MEDICINE

## 2025-06-18 ENCOUNTER — TELEPHONE (OUTPATIENT)
Dept: FAMILY MEDICINE CLINIC | Facility: CLINIC | Age: 80
End: 2025-06-18

## 2025-06-26 ENCOUNTER — TELEPHONE (OUTPATIENT)
Facility: CLINIC | Age: 80
End: 2025-06-26

## 2025-06-26 NOTE — TELEPHONE ENCOUNTER
#[id number]    Patient contacted and date of birth verified.  Patient asking to confirm her appointment time with Maegan Naranjo.Information given.  No further questions at this time.

## 2025-06-26 NOTE — TELEPHONE ENCOUNTER
Patient's daughter calling regards sooner appointment for Dr Montesinos. States per Dr Montesinos patient was instructed to follow up with Dr Montesinos regards treatment. Explained patient is already scheduled with NP Maegan Naranjo. Still asking to speak to RN. Please call.

## 2025-06-27 ENCOUNTER — LAB ENCOUNTER (OUTPATIENT)
Dept: LAB | Age: 80
End: 2025-06-27
Attending: FAMILY MEDICINE
Payer: MEDICARE

## 2025-06-27 ENCOUNTER — OFFICE VISIT (OUTPATIENT)
Dept: FAMILY MEDICINE CLINIC | Facility: CLINIC | Age: 80
End: 2025-06-27

## 2025-06-27 VITALS
HEART RATE: 75 BPM | WEIGHT: 135 LBS | BODY MASS INDEX: 28 KG/M2 | DIASTOLIC BLOOD PRESSURE: 76 MMHG | SYSTOLIC BLOOD PRESSURE: 126 MMHG

## 2025-06-27 DIAGNOSIS — I10 ESSENTIAL HYPERTENSION: Primary | ICD-10-CM

## 2025-06-27 DIAGNOSIS — Z23 NEED FOR PNEUMOCOCCAL VACCINE: ICD-10-CM

## 2025-06-27 DIAGNOSIS — E53.8 VITAMIN B12 DEFICIENCY: ICD-10-CM

## 2025-06-27 DIAGNOSIS — E55.9 VITAMIN D DEFICIENCY: ICD-10-CM

## 2025-06-27 DIAGNOSIS — Z00.00 ENCOUNTER FOR ANNUAL HEALTH EXAMINATION: ICD-10-CM

## 2025-06-27 LAB
CHOLEST SERPL-MCNC: 221 MG/DL (ref ?–200)
EST. AVERAGE GLUCOSE BLD GHB EST-MCNC: 123 MG/DL (ref 68–126)
FASTING PATIENT LIPID ANSWER: YES
HBA1C MFR BLD: 5.9 % (ref ?–5.7)
HDLC SERPL-MCNC: 74 MG/DL (ref 40–59)
LDLC SERPL CALC-MCNC: 123 MG/DL (ref ?–100)
NONHDLC SERPL-MCNC: 147 MG/DL (ref ?–130)
TRIGL SERPL-MCNC: 136 MG/DL (ref 30–149)
VIT B12 SERPL-MCNC: >2000 PG/ML (ref 211–911)
VIT D+METAB SERPL-MCNC: 93.9 NG/ML (ref 30–100)
VLDLC SERPL CALC-MCNC: 24 MG/DL (ref 0–30)

## 2025-06-27 PROCEDURE — 83036 HEMOGLOBIN GLYCOSYLATED A1C: CPT | Performed by: FAMILY MEDICINE

## 2025-06-27 PROCEDURE — G2211 COMPLEX E/M VISIT ADD ON: HCPCS | Performed by: FAMILY MEDICINE

## 2025-06-27 PROCEDURE — 80061 LIPID PANEL: CPT | Performed by: FAMILY MEDICINE

## 2025-06-27 PROCEDURE — 36415 COLL VENOUS BLD VENIPUNCTURE: CPT | Performed by: FAMILY MEDICINE

## 2025-06-27 PROCEDURE — G0009 ADMIN PNEUMOCOCCAL VACCINE: HCPCS | Performed by: FAMILY MEDICINE

## 2025-06-27 PROCEDURE — 82306 VITAMIN D 25 HYDROXY: CPT

## 2025-06-27 PROCEDURE — 90677 PCV20 VACCINE IM: CPT | Performed by: FAMILY MEDICINE

## 2025-06-27 PROCEDURE — 82607 VITAMIN B-12: CPT | Performed by: FAMILY MEDICINE

## 2025-06-27 PROCEDURE — 99214 OFFICE O/P EST MOD 30 MIN: CPT | Performed by: FAMILY MEDICINE

## 2025-06-27 RX ORDER — NIFEDIPINE 30 MG/1
30 TABLET, EXTENDED RELEASE ORAL DAILY
Qty: 90 TABLET | Refills: 3 | Status: SHIPPED | OUTPATIENT
Start: 2025-06-27

## 2025-06-27 NOTE — PROGRESS NOTES
HPI:   Maribel Blanco is a 79 year old female who presents for a follow up visit.    Patient was seen in the office by other provider for evaluation of fatigue.  Here to follow-up on her lab work.  Patient was only the rest of her annual physical exam labs including vitamin D, vitamin B12, A1c and lipid panel today.  Her blood pressure is normal in the office.  No reported symptoms of chest pain or shortness of breath.  She is consistent with taking her nifedipine nightly.    Was previously prescribed pregabalin for left shoulder pain.  States that she discontinued the medication on her own.  Her pain is tolerable.    Wt Readings from Last 3 Encounters:   06/27/25 135 lb (61.2 kg)   06/10/25 135 lb 12.8 oz (61.6 kg)   04/02/25 130 lb (59 kg)     Body mass index is 28.22 kg/m².       Current Medications[1]   Past Medical History[2]   Past Surgical History[3]   Family History[4]   Social History:   Short Social Hx on File[5]       REVIEW OF SYSTEMS:   Negative, except per HPI.     EXAM:   /76   Pulse 75   Wt 135 lb (61.2 kg)   BMI 28.22 kg/m²     GENERAL: well developed, well nourished, in no apparent distress  SKIN: no rashes, no suspicious lesions  HEENT: atraumatic, normocephalic  EYES: PERRLA, EOMI,conjunctiva are clear  NECK: supple, no adenopathy    ASSESSMENT AND PLAN:    Essential hypertension  - Stable condition, continue present management.    - NIFEdipine ER 30 MG Oral Tablet 24 Hr; Take 1 tablet (30 mg total) by mouth daily.  Dispense: 90 tablet; Refill: 3    Need for pneumococcal vaccine    - Prevnar 20 (PCV20) [40783]    Vitamin D deficiency    - Vitamin D [E]; Future    Vitamin B12 deficiency    - Vitamin B12 [E]    Remaining annual physical labs not obtained at last office visit/acute visit ordered today.  Patient to schedule her actual physical in October and we will reorder any abnormal lab work at that time.     Mary Walter MD  6/27/2025  9:28 AM           [1]   Current Outpatient  Medications   Medication Sig Dispense Refill    Ascorbic Acid (VITAMIN C OR) Take 1 tablet by mouth in the morning.      alendronate 70 MG Oral Tab Take 1 tablet (70 mg total) by mouth once a week. 12 tablet 3    NIFEdipine ER 30 MG Oral Tablet 24 Hr Take 1 tablet (30 mg total) by mouth daily. 90 tablet 3    Omeprazole 40 MG Oral Capsule Delayed Release Take 1 capsule (40 mg total) by mouth daily.      Ergocalciferol (VITAMIN D OR) Take 1 tablet by mouth in the morning.      Calcium Carbonate 600 MG Oral Tab Take 1 tablet (600 mg total) by mouth daily. (Patient not taking: Reported on 3/21/2025)     [2]   Past Medical History:   Anesthesia complication    Arthritis    Cataract    Esophageal reflux    High blood pressure    PONV (postoperative nausea and vomiting)    n/v   [3]   Past Surgical History:  Procedure Laterality Date    Arthroscopy of joint unlisted Left     knee    Cataract      Colonoscopy N/A 1/20/2021    Procedure: COLONOSCOPY;  Surgeon: Qasim Montesinos MD;  [facility]    Colonoscopy N/A 4/15/2025    Procedure: COLONOSCOPY;  Surgeon: Qasim Montesinos MD;  Location: [facility] MAIN OR    Knee arthroscopy Left 2002    right knee arthroscopy   [4]   Family History  Family history unknown: Yes   [5]   Social History  Socioeconomic History    Marital status:    Tobacco Use    Smoking status: Never     Passive exposure: Never    Smokeless tobacco: Never   Vaping Use    Vaping status: Never Used   Substance and Sexual Activity    Alcohol use: Yes     Comment: socially/ocassionally    Drug use: No

## 2025-07-21 ENCOUNTER — TELEPHONE (OUTPATIENT)
Facility: CLINIC | Age: 80
End: 2025-07-21

## 2025-07-21 NOTE — TELEPHONE ENCOUNTER
Per EGD 03/26/2025 done by Dr :    Recommend:  1. Here are some reflux precautions:   - Avoid trigger foods  - Anti-reflux measures: raising the head of the bed at night, avoiding tight clothing or belts, avoiding eating late at night and not lying down shortly after mealtime and achieving weight loss   - Avoid NSAID's, caffeine, peppermints, alcohol, tobacco and foods that incite symptoms   2. Continue PPI daily before breakfast    Last filled at [facility] 04/11/2025. Pt should have refills    I spoke to Marj BRIGGS. She will fill the prescription for the pt

## 2025-07-21 NOTE — TELEPHONE ENCOUNTER
Current Outpatient Medications   Medication Sig Dispense Refill    Omeprazole 40 MG Oral Capsule Delayed Release Take 1 capsule (40 mg total) by mouth daily.

## 2025-07-24 ENCOUNTER — TELEPHONE (OUTPATIENT)
Facility: CLINIC | Age: 80
End: 2025-07-24

## 2025-07-24 NOTE — TELEPHONE ENCOUNTER
Maegan-    I spoke to the patient's daughter Margi (ok per CHERISE)    Margi states that patient received recall letter for h. Pylori test and noted that omeprazole should be stopped 2 weeks prior to testing. Patient is currently taking omeprazole 40 mg daily.    Margi wants to know what medication you recommend patient take for acid reflux while she is holding her PPI    Please advise    Thank you

## 2025-07-24 NOTE — TELEPHONE ENCOUNTER
She can use pepcid/famotidine 40 mg twice daily as an alternative to omeprazole.    Thanks,  Maegan

## 2025-07-24 NOTE — TELEPHONE ENCOUNTER
Patient daughter is calling in regards to the reminder letter for a H-Pylori test.  She states that she has questions regarding this test.  Please call

## 2025-08-08 ENCOUNTER — NURSE ONLY (OUTPATIENT)
Dept: LAB | Age: 80
End: 2025-08-08
Attending: NURSE PRACTITIONER

## 2025-08-08 PROCEDURE — 83013 H PYLORI (C-13) BREATH: CPT | Performed by: NURSE PRACTITIONER

## 2025-08-09 LAB — H PYLORI BREATH TEST: NEGATIVE

## (undated) DEVICE — STERILE TETRA-FLEX CF, ELASTIC BANDAGEE, 3" X 5.5YD: Brand: TETRA-FLEX™CF

## (undated) DEVICE — KIT CLEAN ENDOKIT 1.1OZ GOWNX2

## (undated) DEVICE — SUTURE ETHILON 3-0 669H

## (undated) DEVICE — SUTURE VICRYL 2-0 FS-1

## (undated) DEVICE — 35 ML SYRINGE REGULAR TIP: Brand: MONOJECT

## (undated) DEVICE — MEDI-VAC NON-CONDUCTIVE SUCTION TUBING 6MM X 1.8M (6FT.) L: Brand: CARDINAL HEALTH

## (undated) DEVICE — SPECIALTY ARM SLING: Brand: DEROYAL

## (undated) DEVICE — BATTERY

## (undated) DEVICE — KIT ENDO ORCAPOD 160/180/190

## (undated) DEVICE — STERILE TETRA-FLEX CF, ELASTIC BANDAGE, 2" X 5.5YD: Brand: TETRA-FLEX™CF

## (undated) DEVICE — Device: Brand: CUSTOM PROCEDURE KIT

## (undated) DEVICE — COTTON UNDERCAST PADDING,REGULAR FINISH: Brand: WEBRIL

## (undated) DEVICE — ZIMMER® STERILE DISPOSABLE TOURNIQUET CUFF WITH PLC, DUAL PORT, DUAL BLADDER, 18 IN. (46 CM)

## (undated) DEVICE — CONMED SCOPE SAVER BITE BLOCK, 20X27 MM: Brand: SCOPE SAVER

## (undated) DEVICE — GIJAW SINGLE-USE BIOPSY FORCEPS WITH NEEDLE: Brand: GIJAW

## (undated) DEVICE — SNARE CAPTI HEX STIFF SMALL

## (undated) DEVICE — Device: Brand: DEFENDO AIR/WATER/SUCTION AND BIOPSY VALVE

## (undated) DEVICE — STERILE LATEX POWDER-FREE SURGICAL GLOVESWITH NITRILE COATING: Brand: PROTEXIS

## (undated) DEVICE — STERILE TETRA-FLEX CF, ELASTIC BANDAGE, 4" X 5.5YD: Brand: TETRA-FLEX™CF

## (undated) DEVICE — LINE MNTR ADLT SET O2 INTMD

## (undated) DEVICE — FORCEP RADIAL JAW 4

## (undated) DEVICE — 3M™ COBAN™ NL STERILE NON-LATEX SELF-ADHERENT WRAP, 2084S, 4 IN X 5 YD (10 CM X 4,5 M), 18 ROLLS/CASE: Brand: 3M™ COBAN™

## (undated) DEVICE — 6 ML SYRINGE LUER-LOCK TIP: Brand: MONOJECT

## (undated) DEVICE — DISPOSABLE BIPOLAR FORCEPS 4" (10.2CM) JEWELERS, STRAIGHT 0.4MM TIP AND 12 FT. (3.6M) CABLE: Brand: KIRWAN

## (undated) DEVICE — 3 ML SYRINGE LUER-LOCK TIP: Brand: MONOJECT

## (undated) DEVICE — SOL  .9 1000ML BTL

## (undated) DEVICE — BIT DRL 110MM 1.8MM SS QC D

## (undated) DEVICE — MEDI-VAC NON-CONDUCTIVE SUCTION TUBING: Brand: CARDINAL HEALTH

## (undated) DEVICE — SUTURE ETHILON 4-0 1667G

## (undated) DEVICE — SUTURE VICRYL 0 CP-1

## (undated) DEVICE — DRAPE MN CARM

## (undated) DEVICE — TRAP MCS 40ML 5IN PLS SCR CAP

## (undated) DEVICE — TRAY SRGPRP PVP IOD WT SCRB SM

## (undated) DEVICE — UPPER EXTREMITY: Brand: MEDLINE INDUSTRIES, INC.

## (undated) DEVICE — YANKAUER,BULB TIP,W/O VENT,RIGID,STERILE: Brand: MEDLINE

## (undated) DEVICE — DRESSING CRTY CNFRM 3IN

## (undated) DEVICE — 60 ML SYRINGE REGULAR TIP: Brand: MONOJECT

## (undated) DEVICE — Device

## (undated) DEVICE — ZIMMER® STERILE DISPOSABLE TOURNIQUET CUFF WITH PLC, DUAL PORT, SINGLE BLADDER, 18 IN. (46 CM)

## (undated) DEVICE — SNARE CAPTI HEX STIFF MEDIUM

## (undated) NOTE — IP AVS SNAPSHOT
[facility]    [address], [place], [place] ~ [phone]                Discharge Summary   2/14/2017    Romana Ramires           Admission Information        Provider Department    2/14/2017 Juliana Poe MD [facility] Pre-Op R · Avoid mowing the lawn, playing sports, or working with power tools/applicances (power saws, electric knives or mixers)   · That you have someone stay with you on your first night home   · Do not drink alcohol or take sleeping pills or tranquilizers   · [facility] \"National Surgical quality Improvement Program\" (NSQIP). Questionnaires will be mailed to randomly selected surgery patients 30 days after their surgery.       If you receive a questionnaire about your surgery, please take a Medicaid plans. To get signed up and covered, please call [phone] and ask to get set up for an insurance coverage that is in-network with [facility].         Visit Information        Department Dept Phone    2/14/2017 12:20 PM [facility] Pre-Op

## (undated) NOTE — LETTER
[place] [address], [place], [place]    Authorization for Surgical Operation and Procedure                               I hereby authorize Qasim Montesinos MD, my physician and his/her assistants (if applicable), which may include medical students, residents, and/or fellows, to perform the following surgical operation/ procedure and administer such anesthesia as may be determined necessary by my physician: Operation/Procedure name (s) ESOPHAGOGASTRODUODENOSCOPY on [facility]   2.   I recognize that during the surgical operation/procedure, unforeseen conditions may necessitate additional or different procedures than those listed above.  I, therefore, further authorize and request that the above-named surgeon, assistants, or designees perform such procedures as are, in their judgment, necessary and desirable.    3.   My surgeon/physician has discussed prior to my surgery the potential benefits, risks and side effects of this procedure; the likelihood of achieving goals; and potential problems that might occur during recuperation.  They also discussed reasonable alternatives to the procedure, including risks, benefits, and side effects related to the alternatives and risks related to not receiving this procedure.  I have had all my questions answered and I acknowledge that no guarantee has been made as to the result that may be obtained.    4.   Should the need arise during my operation/procedure, which includes change of level of care prior to discharge, I also consent to the administration of blood and/or blood products.  Further, I understand that despite careful testing and screening of blood or blood products by collecting agencies, I may still be subject to ill effects as a result of receiving a blood transfusion and/or blood products.  The following are some, but not all, of the potential risks that can occur: fever and allergic reactions, hemolytic reactions, transmission of diseases  such as Hepatitis, AIDS and Cytomegalovirus (CMV) and fluid overload.  In the event that I wish to have an autologous transfusion of my own blood, or a directed donor transfusion, I will discuss this with my physician.  Check only if Refusing Blood or Blood Products  I understand refusal of blood or blood products as deemed necessary by my physician may have serious consequences to my condition to include possible death. I hereby assume responsibility for my refusal and release the hospital, its personnel, and my physicians from any responsibility for the consequences of my refusal.    o  Refuse   5.   I authorize the use of any specimen, organs, tissues, body parts or foreign objects that may be removed from my body during the operation/procedure for diagnosis, research or teaching purposes and their subsequent disposal by hospital authorities.  I also authorize the release of specimen test results and/or written reports to my treating physician on the hospital medical staff or other referring or consulting physicians involved in my care, at the discretion of the Pathologist or my treating physician.    6.   I consent to the photographing or videotaping of the operations or procedures to be performed, including appropriate portions of my body for medical, scientific, or educational purposes, provided my identity is not revealed by the pictures or by descriptive texts accompanying them.  If the procedure has been photographed/videotaped, the surgeon will obtain the original picture, image, videotape or CD.  The hospital will not be responsible for storage, release or maintenance of the picture, image, tape or CD.    7.   I consent to the presence of a  or observers in the operating room as deemed necessary by my physician or their designees.    8.   I recognize that in the event my procedure results in extended X-Ray/fluoroscopy time, I may develop a skin reaction.    9. If I have a Do Not Attempt  Resuscitation (DNAR) order in place, that status will be suspended while in the operating room, procedural suite, and during the recovery period unless otherwise explicitly stated by me (or a person authorized to consent on my behalf). The surgeon or my attending physician will determine when the applicable recovery period ends for purposes of reinstating the DNAR order.  10. Patients having a sterilization procedure: I understand that if the procedure is successful the results will be permanent and it will therefore be impossible for me to inseminate, conceive, or bear children.  I also understand that the procedure is intended to result in sterility, although the result has not been guaranteed.   11. I acknowledge that my physician has explained sedation/analgesia administration to me including the risk and benefits I consent to the administration of sedation/analgesia as may be necessary or desirable in the judgment of my physician.    I CERTIFY THAT I HAVE READ AND FULLY UNDERSTAND THE ABOVE CONSENT TO OPERATION and/or OTHER PROCEDURE.     ____________________________________  _________________________________        ______________________________  Signature of Patient    Signature of Responsible Person                Printed Name of Responsible Person                                      ____________________________________  _____________________________                ________________________________  Signature of Witness        Date  Time         Relationship to Patient    STATEMENT OF PHYSICIAN My signature below affirms that prior to the time of the procedure; I have explained to the patient and/or his/her legal representative, the risks and benefits involved in the proposed treatment and any reasonable alternative to the proposed treatment. I have also explained the risks and benefits involved in refusal of the proposed treatment and alternatives to the proposed treatment and have answered the patient's  questions. If I have a significant financial interest in a co-management agreement or a significant financial interest in any product or implant, or other significant relationship used in this procedure/surgery, I have disclosed this and had a discussion with my patient.     _____________________________________________________              _____________________________  (Signature of Physician)                                                                                         (Date)                                   (Time)  Patient Name: Maribel Blanco      : 10/10/1945      Printed: 3/25/2025     Medical Record #: [id number]                                      Page 1 of 1

## (undated) NOTE — LETTER
04/22/25        Maribel Wetzelrco  [address]  [place] [address]      Estimado Maribel Wetzelrco,    Nuestros registros indican que tiene análisis clínicos pendientes y/o pruebas que se ordenaron en tierney nombre que no se pablo completado.  Labs Order:  Helicobacter Pylori Breath Test, Adult (Order #[id number]) on 3/17/25                 [employer], [employer]   ----  German:  Daniele al laboratorio para completar esta prueba. Asegúrese de no toney un PPI (omeprazol/pantoprazol) o un bloqueador H2 (famotidina) dianne las 2 semanas anteriores a la prueba, ya que esto podría rubina becky resultado un resultado falso negativo. Tampoco coma oniel hora antes de la prueba.  .   Por favor llame a Central scheduling al [phone] /[phone] para programar esta orden de prueba  Para brindarle la mejor atención posible, por favor complete estos análisis/pruebas a la brevedad posible.    Si completó estos análisis/pruebas recientemente, por favor ignore esta carta.  Si tiene alguna pregunta, llame a la oficina al Dept: [phone].    Maegan Akbar, APRN

## (undated) NOTE — MR AVS SNAPSHOT
[address] at [facility]  [address]  [facility], [address] [place] [address]               Thank you for choosing us for your health care visit with Janak Miner MD.  We are glad to serve you and happy to provide You can get these medications from any pharmacy     Bring a paper prescription for each of these medications    - Pseudoephedrine-Codeine-GG 30- MG/5ML Arlette Ortiz     Sign up for [employer], your secure online medical record.   Angel will HOW TO GET STARTED: HOW TO STAY MOTIVATED:   Start activities slowly and build up over time Do what you like   Get your heart pumping – brisk walking, biking, swimming Even 10 minute increments are effective and add up over the week   2 ½ hours per week –

## (undated) NOTE — LETTER
04/13/21        Maribel Wetzelrco  [address] registros indican que tiene análisis clínicos pendientes y/o pruebas que se ordenaron en tierney nombre que no se pablo completado.      HEPATIC FUNCT

## (undated) NOTE — MR AVS SNAPSHOT
[address] at [facility]  [address]  [facility], [address] [facility]                Thank you for choosing us for your health care visit with Rudy Montemayor MD.  We are glad to serve you and happy to provide TraMADol HCl 50 MG Tabs   Take 1 tablet (50 mg total) by mouth every 6 (six) hours as needed for Pain. Commonly known as:  ULTRAM                   MyChart     Sign up for [employer], your secure online medical record.   [employer] will allow you to access pat

## (undated) NOTE — LETTER
1/27/2021              [address]         [facility]     The biopsies from the stomach were negative for H pylori infection. No obvious severe reflux changes.  Continue antireflux measures.

## (undated) NOTE — LETTER
[facility]  Administration of Anesthesia  Maribel HUTCHINS agree to be cared for by a physician anesthesiologist alone and/or with a nurse anesthetist, who is specially trained to monitor me and give me medicine to put me to sleep or keep me comfortable during my procedure    I understand that my anesthesiologist and/or anesthetist is not an employee or agent of [facility] or [employer]. He or she works for [facility]    As the patient asking for anesthesia services, I agree to:  Allow the anesthesiologist (anesthesia doctor) to give me medicine and do additional procedures as necessary. Some examples are: Starting or using an “IV” to give me medicine, fluids or blood during my procedure, and having a breathing tube placed to help me breathe when I’m asleep (intubation). In the event that my heart stops working properly, I understand that my anesthesiologist will make every effort to sustain my life, unless otherwise directed by [facility] Do Not Resuscitate documents.  Tell my anesthesia doctor before my procedure:  If I am pregnant.  The last time that I ate or drank.  iii. All of the medicines I take (including prescriptions, herbal supplements, and pills I can buy without a prescription (including street drugs/illegal medications). Failure to inform my anesthesiologist about these medicines may increase my risk of anesthetic complications.  iv.If I am allergic to anything or have had a reaction to anesthesia before.  I understand how the anesthesia medicine will help me (benefits).  I understand that with any type of anesthesia medicine there are risks:  The most common risks are: nausea, vomiting, sore throat, muscle soreness, damage to my eyes, mouth, or teeth (from breathing tube placement).  Rare risks include: remembering what happened during my procedure, allergic reactions to medications, injury to my airway, heart, lungs, vision, nerves, or muscles  and in extremely rare instances death.  My doctor has explained to me other choices available to me for my care (alternatives).  Pregnant Patients (“epidural”):  I understand that the risks of having an epidural (medicine given into my back to help control pain during labor), include itching, low blood pressure, difficulty urinating, headache or slowing of the baby’s heart. Very rare risks include infection, bleeding, seizure, irregular heart rhythms and nerve injury.  Regional Anesthesia (“spinal”, “epidural”, & “nerve blocks”):  I understand that rare but potential complications include headache, bleeding, infection, seizure, irregular heart rhythms, and nerve injury.    _____________________________________________________________________________  Patient (or Representative) Signature/Relationship to Patient  Date   Time    _____________________________________________________________________________   Name (if used)    Language/Organization   Time    _____________________________________________________________________________  Nurse Anesthetist Signature     Date   Time  _____________________________________________________________________________  Anesthesiologist Signature     Date   Time  I have discussed the procedure and information above with the patient (or patient’s representative) and answered their questions. The patient or their representative has agreed to have anesthesia services.    _____________________________________________________________________________  Witness        Date   Time  I have verified that the signature is that of the patient or patient’s representative, and that it was signed before the procedure  Patient Name: Maribel Blanco     : 10/10/1945                 Printed: 3/25/2025 at 7:29 AM    Medical Record #: [id number]                                            Page 1 of 1  ----------ANESTHESIA CONSENT----------